# Patient Record
Sex: MALE | Employment: UNEMPLOYED | URBAN - NONMETROPOLITAN AREA
[De-identification: names, ages, dates, MRNs, and addresses within clinical notes are randomized per-mention and may not be internally consistent; named-entity substitution may affect disease eponyms.]

---

## 2021-03-15 ENCOUNTER — READMISSION MANAGEMENT (OUTPATIENT)
Dept: CALL CENTER | Facility: HOSPITAL | Age: 36
End: 2021-03-15

## 2021-03-15 ENCOUNTER — TELEPHONE (OUTPATIENT)
Dept: PEDIATRICS | Facility: OTHER | Age: 36
End: 2021-03-15

## 2021-03-15 NOTE — TELEPHONE ENCOUNTER
Caller: EDD WITH [facility]    Relationship to patient:     Best call back number: [phone]    PATIENT'S CALL BACK NUMBER [phone]    New or established patient?  [x] New  [] Established    Date of discharge: 03/15/2021    Facility discharged from: [facility]   Diagnosis/Symptoms: HEART ATTACK    Length of stay (If applicable): 03/14/2021 - 03/15/2021    Specialty Only: Did you see a [facility] provider?    [x] Yes  [] No  If so, who? DR MINDY GREY - CARDIOLOGIST

## 2021-03-15 NOTE — OUTREACH NOTE
Prep Survey      Responses   [facility] patient discharged from?  Non-[facility]   Is LACE score < 7 ?  Non-BH Discharge   Emergency Room discharge w/ pulse ox?  No   Eligibility  TCM   Hospital  [facility]   Date of Admission  03/14/21   Date of Discharge  03/15/21   Discharge diagnosis  Heart attack   Does the patient have one of the following disease processes/diagnoses(primary or secondary)?  Acute MI (STEMI,NSTEMI)   Prep survey completed?  Yes          Katlin Lim RN

## 2021-03-16 ENCOUNTER — TRANSITIONAL CARE MANAGEMENT TELEPHONE ENCOUNTER (OUTPATIENT)
Dept: CALL CENTER | Facility: HOSPITAL | Age: 36
End: 2021-03-16

## 2021-03-16 NOTE — OUTREACH NOTE
Call Center TCM Note      Responses   [facility] patient discharged from?  Non- [[facility]]   Does the patient have one of the following disease processes/diagnoses(primary or secondary)?  Acute MI (STEMI,NSTEMI)   TCM attempt successful?  No   Unsuccessful attempts  Attempt 1          Earlene Batres RN    3/16/2021, 14:09 EDT

## 2021-03-16 NOTE — OUTREACH NOTE
Call Center TCM Note      Responses   [facility] patient discharged from?  Non-BH [[facility]]   Does the patient have one of the following disease processes/diagnoses(primary or secondary)?  Acute MI (STEMI,NSTEMI)   TCM attempt successful?  Yes   Call start time  1417   Call end time  1429   Discharge diagnosis  Heart attack   Is patient permission given to speak with other caregiver?  No   Meds reviewed with patient/caregiver?  No [Patient to bring discharge medication list to appt on Thurs. Patient states that he has all meds prescribed and taking as directed. ]   Is the patient having any side effects they believe may be caused by any medication additions or changes?  No   Does the patient have all medications ordered at discharge?  Yes   Is the patient taking all medications as directed (includes completed medication regime)?  Yes   Does the patient have a primary care provider?   Yes [Patient has new PCP appt scheduled. ]   Does the patient have an appointment with their PCP within 7 days of discharge?  Yes   Comments regarding PCP  New PCP appt with Dr Alfonso Mcneill scheduled for Thurs 3/18/21   1115am   Has the patient kept scheduled appointments due by today?  N/A   Has home health visited the patient within 72 hours of discharge?  N/A   Psychosocial issues?  No   Did the patient receive a copy of their discharge instructions?  Yes   Nursing interventions  -- [Patient to bring discharge instructions to appt. Patient advised to seek closest ER with any s/s of MI: pain in chest/neck/jaw/back/shoulders/arms, shortness of breath, N/V, cold clammy sweat, dizziness/lightheadedness, heart racing/palpitations. ]   What is the patient's perception of their health status since discharge?  Improving   Is the patient/caregiver able to teach back signs and symptoms related to disease process for when to call PCP?  Yes   Is the patient/caregiver able to teach back signs and symptoms related to disease process for when  to call 911?  Yes   Is the patient/caregiver able to teach back the hierarchy of who to call/visit for symptoms/problems? PCP, Specialist, Home health nurse, Urgent Care, ED, 911  Yes   If the patient is a current smoker, are they able to teach back resources for cessation?  Smoking cessation medications [Patient reports using nicotine patches for smoking cessation. ]   TCM call completed?  Yes   Wrap up additional comments  Patient denies any further questions or needs until appt on Thurs.           Earlene Batres RN    3/16/2021, 14:29 EDT

## 2021-03-18 ENCOUNTER — OFFICE VISIT (OUTPATIENT)
Dept: FAMILY MEDICINE CLINIC | Facility: CLINIC | Age: 36
End: 2021-03-18

## 2021-03-18 VITALS
HEIGHT: 78 IN | WEIGHT: 264 LBS | SYSTOLIC BLOOD PRESSURE: 110 MMHG | HEART RATE: 89 BPM | OXYGEN SATURATION: 98 % | BODY MASS INDEX: 30.55 KG/M2 | DIASTOLIC BLOOD PRESSURE: 76 MMHG

## 2021-03-18 DIAGNOSIS — I10 ESSENTIAL HYPERTENSION: Primary | ICD-10-CM

## 2021-03-18 DIAGNOSIS — I21.4 NON-ST ELEVATION MYOCARDIAL INFARCTION (NSTEMI) (HCC): ICD-10-CM

## 2021-03-18 DIAGNOSIS — E78.5 HYPERLIPIDEMIA, UNSPECIFIED HYPERLIPIDEMIA TYPE: ICD-10-CM

## 2021-03-18 PROCEDURE — 99204 OFFICE O/P NEW MOD 45 MIN: CPT | Performed by: INTERNAL MEDICINE

## 2021-03-18 RX ORDER — ASPIRIN 81 MG/1
81 TABLET ORAL DAILY
COMMUNITY

## 2021-03-18 RX ORDER — ATORVASTATIN CALCIUM 40 MG/1
40 TABLET, FILM COATED ORAL DAILY
COMMUNITY

## 2021-03-18 RX ORDER — NITROGLYCERIN 80 MG/1
1 PATCH TRANSDERMAL DAILY
COMMUNITY

## 2021-03-18 RX ORDER — NICOTINE 21 MG/24HR
1 PATCH, TRANSDERMAL 24 HOURS TRANSDERMAL EVERY 24 HOURS
COMMUNITY
End: 2021-05-19

## 2021-03-18 NOTE — PROGRESS NOTES
Arun Anaya  1985  [id number]  Patient Care Team:  Alfonso Mcneill MD as PCP - General (Internal Medicine)    Arun Anaya is a 36 y.o. male here today to establish care.  This patient is accompanied by their self who contributes to the history of their care.    Chief Complaint:    Chief Complaint   Patient presents with   • Establish Care   • Heart Problem     heart attack on the 3/14/21-3/15/21 went to [facility] feels better today        History of Present Illness:   Admitted to [place] for chest pain. Dx with NSTEMI. Admitted. S/p PCI/, doesn't recall target vessle. No further chest pain, SOA, SHEA. Syncope    Denies myalgias. Has not returned to work as . Has cardiology follow up . Has family history of amyloidosis and sle and would like to be tested for these. His mother  at age 43 from complications/MI and amyloidosis.  He is eager to return to work .  Is cut his smoking down from 2 packs to half pack per day.    Past Medical History:   Diagnosis Date   • Hyperlipidemia    • Hypertension    • Myocardial infarction (CMS/HCC)        Past Surgical History:   Procedure Laterality Date   • CORONARY STENT PLACEMENT     • EAR RECONSTRUCTION          Family History   Problem Relation Age of Onset   • Cancer Mother    • Heart attack Mother    • Lupus Maternal Grandmother        Social History     Socioeconomic History   • Marital status: Single     Spouse name: Not on file   • Number of children: Not on file   • Years of education: Not on file   • Highest education level: Not on file   Tobacco Use   • Smoking status: Current Every Day Smoker     Packs/day: 0.50     Types: Cigarettes   • Smokeless tobacco: Never Used   Substance and Sexual Activity   • Alcohol use: Yes     Comment: occasionally 5th in a month of crown   • Drug use: Never   • Sexual activity: Defer       No Known Allergies    Review of Systems:    Review of Systems   Constitutional: Negative for chills,  "fatigue, fever, unexpected weight gain and unexpected weight loss.   HENT: Negative for ear pain, postnasal drip, sinus pressure and sore throat.    Eyes: Negative for blurred vision, double vision and visual disturbance.   Respiratory: Negative for cough, shortness of breath and wheezing.    Cardiovascular: Negative for chest pain, palpitations and leg swelling.   Gastrointestinal: Negative for abdominal pain, blood in stool, diarrhea, nausea and vomiting.   Endocrine: Negative for cold intolerance, heat intolerance, polydipsia, polyphagia and polyuria.   Genitourinary: Negative for dysuria, flank pain and hematuria.   Musculoskeletal: Negative for arthralgias and joint swelling.   Skin: Negative for dry skin and rash.   Neurological: Negative for weakness, numbness and headache.   Psychiatric/Behavioral: Negative for self-injury, suicidal ideas and depressed mood.       Vitals:    03/18/21 1101   BP: 110/76   Pulse: 89   SpO2: 98%   Weight: 120 kg (264 lb)   Height: 210.8 cm (83\")     Body mass index is 26.94 kg/m².      Current Outpatient Medications:   •  aspirin 81 MG EC tablet, Take 81 mg by mouth Daily., Disp: , Rfl:   •  atorvastatin (LIPITOR) 40 MG tablet, Take 40 mg by mouth Daily., Disp: , Rfl:   •  metoprolol tartrate (LOPRESSOR) 25 MG tablet, Take 25 mg by mouth 2 (Two) Times a Day., Disp: , Rfl:   •  nicotine (NICODERM CQ) 21 MG/24HR patch, Place 1 patch on the skin as directed by provider Daily., Disp: , Rfl:   •  nitroglycerin (Nitro-Dur) 0.4 MG/HR patch, Place 1 patch on the skin as directed by provider Daily., Disp: , Rfl:   •  TICAGRELOR PO, Take 90 mg by mouth 2 (two) times a day., Disp: , Rfl:     Physical Exam:    Physical Exam  Vitals and nursing note reviewed.   Constitutional:       General: He is not in acute distress.     Appearance: He is well-developed. He is not diaphoretic.   HENT:      Head: Normocephalic and atraumatic.      Right Ear: External ear normal.      Left Ear: External ear " normal.      Mouth/Throat:      Pharynx: No oropharyngeal exudate.   Eyes:      General: No scleral icterus.        Right eye: No discharge.      Conjunctiva/sclera: Conjunctivae normal.   Neck:      Thyroid: No thyromegaly.      Vascular: No JVD.      Trachea: No tracheal deviation.   Cardiovascular:      Rate and Rhythm: Normal rate and regular rhythm.      Pulses: Normal pulses.      Heart sounds: Normal heart sounds.      Comments: PMI nondisplaced  Pulmonary:      Effort: Pulmonary effort is normal.      Breath sounds: Normal breath sounds. No wheezing or rales.   Abdominal:      General: Bowel sounds are normal.      Palpations: Abdomen is soft.      Tenderness: There is no abdominal tenderness. There is no guarding or rebound.   Musculoskeletal:      Cervical back: Normal range of motion and neck supple.      Comments: Normal gait   Lymphadenopathy:      Cervical: No cervical adenopathy.   Skin:     General: Skin is warm and dry.      Capillary Refill: Capillary refill takes less than 2 seconds.      Coloration: Skin is not pale.      Findings: No rash.   Neurological:      Mental Status: He is alert and oriented to person, place, and time.      Motor: No abnormal muscle tone.      Coordination: Coordination normal.   Psychiatric:         Judgment: Judgment normal.         Procedures    Results Review:    None    Assessment/Plan:  Is a 36-year-old gentleman with hypertension dyslipidemia went left heart catheterization and percutaneous intervention in [facility].  We do not have records that we requested him earlier this morning.  He is asymptomatic.  He maintains on dual antiplatelet therapy.  His blood pressure is under control.  He will return here in approximately 8 weeks for fasting lipid profile.  I have released him to return to work without restrictions.  Problem List Items Addressed This Visit        Cardiac and Vasculature    Hypertension - Primary    Current Assessment & Plan     Hypertension  is improving with treatment.  Continue current treatment regimen.  Dietary sodium restriction.  Weight loss.  Regular aerobic exercise.  Stop smoking.  Continue current medications.  Blood pressure will be reassessed at the next regular appointment.         Relevant Medications    metoprolol tartrate (LOPRESSOR) 25 MG tablet    Myocardial infarction (CMS/HCC)    Relevant Medications    nitroglycerin (Nitro-Dur) 0.4 MG/HR patch    metoprolol tartrate (LOPRESSOR) 25 MG tablet    TICAGRELOR PO    Hyperlipidemia    Current Assessment & Plan     Lipid abnormalities are newly identified.  Nutritional counseling was provided. and Pharmacotherapy as ordered.  Lipids will be reassessed  8 weeks.         Relevant Medications    atorvastatin (LIPITOR) 40 MG tablet          Plan of care reviewed with patient at the conclusion of today's visit. Education was provided regarding diagnosis and management.  Patient verbalizes understanding of and agreement with management plan.    Return in about 2 months (around 5/18/2021), or cholesterol.    Alfonso Mcneill MD    Please note that portions of this note may have been completed with a voice recognition program. Efforts were made to edit the dictations, but occasionally words are mistranscribed.

## 2021-03-18 NOTE — ASSESSMENT & PLAN NOTE
Lipid abnormalities are newly identified.  Nutritional counseling was provided. and Pharmacotherapy as ordered.  Lipids will be reassessed  8 weeks.

## 2021-04-13 ENCOUNTER — OFFICE VISIT CONVERTED (OUTPATIENT)
Dept: CARDIOLOGY | Facility: CLINIC | Age: 36
End: 2021-04-13
Attending: INTERNAL MEDICINE

## 2021-05-14 VITALS
HEART RATE: 84 BPM | BODY MASS INDEX: 36.16 KG/M2 | SYSTOLIC BLOOD PRESSURE: 126 MMHG | DIASTOLIC BLOOD PRESSURE: 80 MMHG | HEIGHT: 72 IN | WEIGHT: 267 LBS

## 2021-05-14 NOTE — PROGRESS NOTES
Progress Note      Patient Name: Arun Anaya   Patient ID: [id number]   Sex: Male   YOB: 1985        Visit Date: April 13, 2021    Provider: Amos Dave MD   Location: [facility]   Location Address: [address] [place] [address], [facility]   WENDY Cuevas  [id number]   Location Phone: [phone]          Chief Complaint     Hospital follow-up.       History Of Present Illness  REFERRING CARE PROVIDER: REFERRING CARE PROVIDER NAME   Arun Anaya is a 36 year old male who presents for routine hospital follow-up today and states he is feeling well since his discharge. He was treated in mid-March for a non-ST elevation myocardial infarction. Urgent left heart catheterization was performed at that time, which revealed a culprit 95% stenosis in the proximal segment of the first obtuse marginal branch of the circumflex. Successful percutaneous coronary intervention was performed using a Xience Samanta 2.5/15 mm drug eluting stent. The remainder of his coronary arteries had only minimal plaquing, and his left ventricular systolic function was preserved with an estimated ejection fraction of 60%. He does not report any bleeding problems on dual antiplatelet therapy with aspirin and Brilinta. He has not experienced any recurrent chest pain/pressure or any dyspnea, palpitations, orthopnea, PND, peripheral edema, lightheadedness, or syncope. He has resumed his normal baseline physical activities. His blood pressure was well controlled at 126/80 today.   PAST MEDICAL HISTORY: Chest pain; NSTEMI; CORONARY ARTERY DISEASE s/p cardiac stenting; Elevated troponin; Elevated blood pressure without formal diagnosis of hypertension; Current tobacco smoker; Family history of premature CAD.   PSYCHOSOCIAL HISTORY: Rarely uses alcohol. Currently smokes 1-1/2 pack per day.   CURRENT MEDICATIONS: Medication list was reviewed and is as documented.      ALLERGIES: No known drug allergies.       Review of  Systems  · Cardiovascular  o Admits  o : swelling (feet, ankles, hands), shortness of breath while walking or lying flat, chest pain or angina pectoris   o Denies  o : palpitations (fast, fluttering, or skipping beats)  · Respiratory  o Denies  o : chronic or frequent cough      Vitals  Date Time BP Position Site L\R Cuff Size HR RR TEMP (F) WT  HT  BMI kg/m2 BSA m2 O2 Sat FR L/min FiO2        04/13/2021 03:42 /80 Sitting    84 - R   267lbs 0oz 6'   36.21 2.48             Physical Examination  · Respiratory  o Auscultation of Lungs  o : Clear to auscultation bilaterally. No wheezing, rales, or rhonchi, no tachypnea. Normal effort with no increased work of breathing.   · Cardiovascular  o Heart  o : Regular rate and rhythm. Normal S1 and S2, no S3 or S4 gallop. No murmur. PMI is not displaced.  · Gastrointestinal  o Abdominal Examination  o : Soft, obese, nondistended, nontender. Normal bowel sounds in all quadrants. No masses.   · Extremities  o Extremities  o : No cyanosis, clubbing, or edema. 2+ radial pulses bilaterally. No erythema or bruising at his right radial cath access site.     CONSTITUTIONAL: Well-developed, well-nourished, alert and oriented, no acute distress.   NECK: Supple, no JVD, no carotid bruit, no masses.   NEUROLOGIC: Normal speech. Cranial nerves II-XII grossly intact. No focal motor deficits.   SKIN: Warm and dry. No jaundice, no rashes or lesions.              Assessment     1.  Coronary artery disease with non-ST elevation myocardial infarction in March 2021 treated with percutaneous coronary intervention to the culprit OM1 branch of the circumflex using a Xience Samanta 2.5/15 mm drug-eluting stent.  2.  Hyperlipidemia, on high-intensity statin therapy.  3.  Chronic ongoing tobacco abuse.  4.  Obesity with a stable BMI of 36.2 today.          Plan     He does not report any recurrent symptoms and is doing well since his hospital discharge. We will continue dual antiplatelet therapy  with aspirin and Brilinta for a minimum of 1 year. We will plan to continue long term beta-blocker therapy with metoprolol, as well as high-intensity statin therapy with an LDL goal of less than 70. His lipid profile and routine lab work is followed by his primary care physician. Tobacco cessation was again strongly recommended and extensive cessation counseling was provided today. Unfortunately, Mr. Anaya is not ready to quit smoking at the present time, and he declines any pharmacologic or other measures to assist with cessation currently. I will readdress this issue at his next visit. Significant weight loss was also recommended, and we reviewed some potential weekly exercise regimens to help with this goal. If he continues to feel well, I will see him back in 8 months for reassessment.         Amos Dave MD  BP/vh                Electronically Signed by: Amos Dave MD -Author on May 5, 2021 10:19:45 AM

## 2021-05-19 ENCOUNTER — OFFICE VISIT (OUTPATIENT)
Dept: FAMILY MEDICINE CLINIC | Facility: CLINIC | Age: 36
End: 2021-05-19

## 2021-05-19 VITALS
HEIGHT: 72 IN | DIASTOLIC BLOOD PRESSURE: 58 MMHG | BODY MASS INDEX: 36.3 KG/M2 | OXYGEN SATURATION: 98 % | SYSTOLIC BLOOD PRESSURE: 132 MMHG | WEIGHT: 268 LBS | HEART RATE: 91 BPM

## 2021-05-19 DIAGNOSIS — E78.5 HYPERLIPIDEMIA, UNSPECIFIED HYPERLIPIDEMIA TYPE: ICD-10-CM

## 2021-05-19 DIAGNOSIS — I10 ESSENTIAL HYPERTENSION: ICD-10-CM

## 2021-05-19 DIAGNOSIS — M54.12 CERVICAL RADICULOPATHY: Primary | ICD-10-CM

## 2021-05-19 PROCEDURE — 99214 OFFICE O/P EST MOD 30 MIN: CPT | Performed by: INTERNAL MEDICINE

## 2021-05-19 RX ORDER — LIDOCAINE 50 MG/G
1 PATCH TOPICAL EVERY 24 HOURS
Qty: 30 EACH | Refills: 1 | Status: SHIPPED | OUTPATIENT
Start: 2021-05-19

## 2021-05-19 RX ORDER — BACLOFEN 10 MG/1
10 TABLET ORAL 2 TIMES DAILY
Qty: 60 TABLET | Refills: 3 | Status: SHIPPED | OUTPATIENT
Start: 2021-05-19

## 2021-05-19 NOTE — PATIENT INSTRUCTIONS
Cervical Radiculopathy    Cervical radiculopathy means that a nerve in the neck (a cervical nerve) is pinched or bruised. This can happen because of an injury to the cervical spine (vertebrae) in the neck, or as a normal part of getting older. This can cause pain or loss of feeling (numbness) that runs from your neck all the way down to your arm and fingers. Often, this condition gets better with rest. Treatment may be needed if the condition does not get better.  What are the causes?  · A neck injury.  · A bulging disk in your spine.  · Muscle movements that you cannot control (muscle spasms).  · Tight muscles in your neck due to overuse.  · Arthritis.  · Breakdown in the bones and joints of the spine (spondylosis) due to getting older.  · Bone spurs that form near the nerves in the neck.  What are the signs or symptoms?  · Pain. The pain may:  ? Run from the neck to the arm and hand.  ? Be very bad or irritating.  ? Be worse when you move your neck.  · Loss of feeling or tingling in your arm or hand.  · Weakness in your arm or hand, in very bad cases.  How is this treated?  In many cases, treatment is not needed for this condition. With rest, the condition often gets better over time. If treatment is needed, options may include:  · Wearing a soft neck collar (cervical collar) for short periods of time, as told by your doctor.  · Doing exercises (physical therapy) to strengthen your neck muscles.  · Taking medicines.  · Having shots (injections) in your spine, in very bad cases.  · Having surgery. This may be needed if other treatments do not help. The type of surgery that is used depends on the cause of your condition.  Follow these instructions at home:  If you have a soft neck collar:  · Wear it as told by your doctor. Remove it only as told by your doctor.  · Ask your doctor if you can remove the collar for cleaning and bathing. If you are allowed to remove the collar for cleaning or bathing:  ? Follow  instructions from your doctor about how to remove the collar safely.  ? Clean the collar by wiping it with mild soap and water and drying it completely.  ? Take out any removable pads in the collar every 1-2 days. Wash them by hand with soap and water. Let them air-dry completely before you put them back in the collar.  ? Check your skin under the collar for redness or sores. If you see any, tell your doctor.  Managing pain         · Take over-the-counter and prescription medicines only as told by your doctor.  · If told, put ice on the painful area.  ? If you have a soft neck collar, remove it as told by your doctor.  ? Put ice in a plastic bag.  ? Place a towel between your skin and the bag.  ? Leave the ice on for 20 minutes, 2-3 times a day.  · If using ice does not help, you can try using heat. Use the heat source that your doctor recommends, such as a moist heat pack or a heating pad.  ? Place a towel between your skin and the heat source.  ? Leave the heat on for 20-30 minutes.  ? Remove the heat if your skin turns bright red. This is very important if you are unable to feel pain, heat, or cold. You may have a greater risk of getting burned.  · You may try a gentle neck and shoulder rub (massage).  Activity  · Rest as needed.  · Return to your normal activities as told by your doctor. Ask your doctor what activities are safe for you.  · Do exercises as told by your doctor or physical therapist.  · Do not lift anything that is heavier than 10 lb (4.5 kg) until your doctor tells you that it is safe.  General instructions  · Use a flat pillow when you sleep.  · Do not drive while wearing a soft neck collar. If you do not have a soft neck collar, ask your doctor if it is safe to drive while your neck heals.  · Ask your doctor if the medicine prescribed to you requires you to avoid driving or using heavy machinery.  · Do not use any products that contain nicotine or tobacco, such as cigarettes, e-cigarettes, and  chewing tobacco. These can delay healing. If you need help quitting, ask your doctor.  · Keep all follow-up visits as told by your doctor. This is important.  Contact a doctor if:  · Your condition does not get better with treatment.  Get help right away if:  · Your pain gets worse and is not helped with medicine.  · You lose feeling or feel weak in your hand, arm, face, or leg.  · You have a high fever.  · You have a stiff neck.  · You cannot control when you poop or pee (have incontinence).  · You have trouble with walking, balance, or talking.  Summary  · Cervical radiculopathy means that a nerve in the neck is pinched or bruised.  · A nerve can get pinched from a bulging disk, arthritis, an injury to the neck, or other causes.  · Symptoms include pain, tingling, or loss of feeling that goes from the neck into the arm or hand.  · Weakness in your arm or hand can happen in very bad cases.  · Treatment may include resting, wearing a soft neck collar, and doing exercises. You might need to take medicines for pain. In very bad cases, shots or surgery may be needed.  This information is not intended to replace advice given to you by your health care provider. Make sure you discuss any questions you have with your health care provider.  Document Revised: 11/08/2019 Document Reviewed: 11/08/2019  [employer] Patient Education © 2021 [employer]    Neck Exercises  Ask your health care provider which exercises are safe for you. Do exercises exactly as told by your health care provider and adjust them as directed. It is normal to feel mild stretching, pulling, tightness, or discomfort as you do these exercises. Stop right away if you feel sudden pain or your pain gets worse. Do not begin these exercises until told by your health care provider.  Neck exercises can be important for many reasons. They can improve strength and maintain flexibility in your neck, which will help your upper back and prevent neck pain.  Stretching  exercises  Rotation neck stretching    1. Sit in a chair or stand up.  2. Place your feet flat on the floor, shoulder width apart.  3. Slowly turn your head (rotate) to the right until a slight stretch is felt. Turn it all the way to the right so you can look over your right shoulder. Do not tilt or tip your head.  4. Hold this position for 10-30 seconds.  5. Slowly turn your head (rotate) to the left until a slight stretch is felt. Turn it all the way to the left so you can look over your left shoulder. Do not tilt or tip your head.  6. Hold this position for 10-30 seconds.  Repeat __________ times. Complete this exercise __________ times a day.  Neck retraction  1. Sit in a sturdy chair or stand up.  2. Look straight ahead. Do not bend your neck.  3. Use your fingers to push your chin backward (retraction). Do not bend your neck for this movement. Continue to face straight ahead. If you are doing the exercise properly, you will feel a slight sensation in your throat and a stretch at the back of your neck.  4. Hold the stretch for 1-2 seconds.  Repeat __________ times. Complete this exercise __________ times a day.  Strengthening exercises  Neck press  1. Lie on your back on a firm bed or on the floor with a pillow under your head.  2. Use your neck muscles to push your head down on the pillow and straighten your spine.  3. Hold the position as well as you can. Keep your head facing up (in a neutral position) and your chin tucked.  4. Slowly count to 5 while holding this position.  Repeat __________ times. Complete this exercise __________ times a day.  Isometrics  These are exercises in which you strengthen the muscles in your neck while keeping your neck still (isometrics).  1. Sit in a supportive chair and place your hand on your forehead.  2. Keep your head and face facing straight ahead. Do not flex or extend your neck while doing isometrics.  3. Push forward with your head and neck while pushing back with  your hand. Hold for 10 seconds.  4. Do the sequence again, this time putting your hand against the back of your head. Use your head and neck to push backward against the hand pressure.  5. Finally, do the same exercise on either side of your head, pushing sideways against the pressure of your hand.  Repeat __________ times. Complete this exercise __________ times a day.  Prone head lifts  1. Lie face-down (prone position), resting on your elbows so that your chest and upper back are raised.  2. Start with your head facing downward, near your chest. Position your chin either on or near your chest.  3. Slowly lift your head upward. Lift until you are looking straight ahead. Then continue lifting your head as far back as you can comfortably stretch.  4. Hold your head up for 5 seconds. Then slowly lower it to your starting position.  Repeat __________ times. Complete this exercise __________ times a day.  Supine head lifts  1. Lie on your back (supine position), bending your knees to point to the ceiling and keeping your feet flat on the floor.  2. Lift your head slowly off the floor, raising your chin toward your chest.  3. Hold for 5 seconds.  Repeat __________ times. Complete this exercise __________ times a day.  Scapular retraction  1. Stand with your arms at your sides. Look straight ahead.  2. Slowly pull both shoulders (scapulae) backward and downward (retraction) until you feel a stretch between your shoulder blades in your upper back.  3. Hold for 10-30 seconds.  4. Relax and repeat.  Repeat __________ times. Complete this exercise __________ times a day.  Contact a health care provider if:  · Your neck pain or discomfort gets much worse when you do an exercise.  · Your neck pain or discomfort does not improve within 2 hours after you exercise.  If you have any of these problems, stop exercising right away. Do not do the exercises again unless your health care provider says that you can.  Get help right away  if:  · You develop sudden, severe neck pain.  If this happens, stop exercising right away. Do not do the exercises again unless your health care provider says that you can.  This information is not intended to replace advice given to you by your health care provider. Make sure you discuss any questions you have with your health care provider.  Document Revised: 10/16/2019 Document Reviewed: 10/16/2019  [employer] Patient Education © 2021 [employer]

## 2021-05-19 NOTE — ASSESSMENT & PLAN NOTE
Hypertension is improving with treatment.  Continue current treatment regimen.  Dietary sodium restriction.  Regular aerobic exercise.  Stop smoking.  Continue current medications.  Blood pressure will be reassessed at the next regular appointment.

## 2021-05-19 NOTE — PROGRESS NOTES
Arun Anaya  1985  [id number]  Patient Care Team:  Alfonso Mcneill MD as PCP - General (Internal Medicine)    Arun Anaya is a 36 y.o. male here today for follow up.     This patient is accompanied by their self who contributes to the history of their care.    Chief Complaint:    Chief Complaint   Patient presents with   • Hyperlipidemia        History of Present Illness:  I have reviewed and/or updated the patient's past medical, past surgical, family, social history, problem list and allergies as appropriate.      Stressed and resumed smoking, nearly 2 full packs per day. Denies f./c cough or wheezing. Denies orthopnea or pnd.  Denies dark urin or muscle aches.   Reports rue pain 4-10. Lasts nearly all day. Feels it is worse since stent was place. Constant, upper shoulder radiating in to right biceps. occaisional finger tips num. Denies weakness. Right handed. This is assoicated with right sided neck pain.     Denies chest pain shortness of breath orthopnea or PND.  Continues to take Lipitor denies any dark urine or muscle pain.    Review of Systems:    Review of Systems   Constitutional: Negative for chills, fatigue, fever, unexpected weight gain and unexpected weight loss.   HENT: Negative for ear pain, postnasal drip, sinus pressure and sore throat.    Eyes: Negative for blurred vision, double vision and visual disturbance.   Respiratory: Negative for cough, shortness of breath and wheezing.    Cardiovascular: Negative for chest pain, palpitations and leg swelling.   Gastrointestinal: Negative for abdominal pain, blood in stool, diarrhea, nausea and vomiting.   Endocrine: Negative for cold intolerance, heat intolerance, polydipsia, polyphagia and polyuria.   Genitourinary: Negative for dysuria, flank pain and hematuria.   Musculoskeletal: Positive for arthralgias and neck pain. Negative for joint swelling.   Skin: Negative for dry skin and rash.   Neurological: Positive for weakness and numbness.  "Negative for headache.   Psychiatric/Behavioral: Negative for self-injury, suicidal ideas and depressed mood.       Vitals:    05/19/21 1431   BP: 132/58   Pulse: 91   SpO2: 98%   Weight: 122 kg (268 lb)   Height: 182.9 cm (72\")     Body mass index is 36.35 kg/m².      Current Outpatient Medications:   •  aspirin 81 MG EC tablet, Take 81 mg by mouth Daily., Disp: , Rfl:   •  atorvastatin (LIPITOR) 40 MG tablet, Take 40 mg by mouth Daily., Disp: , Rfl:   •  metoprolol tartrate (LOPRESSOR) 25 MG tablet, Take 25 mg by mouth 2 (Two) Times a Day., Disp: , Rfl:   •  nitroglycerin (Nitro-Dur) 0.4 MG/HR patch, Place 1 patch on the skin as directed by provider Daily., Disp: , Rfl:   •  TICAGRELOR PO, Take 90 mg by mouth 2 (two) times a day., Disp: , Rfl:   •  baclofen (LIORESAL) 10 MG tablet, Take 1 tablet by mouth 2 (Two) Times a Day., Disp: 60 tablet, Rfl: 3  •  lidocaine (Lidoderm) 5 %, Place 1 patch on the skin as directed by provider Daily. Remove & Discard patch within 12 hours or as directed by MD, Disp: 30 each, Rfl: 1    Physical Exam:    Physical Exam  Vitals and nursing note reviewed.   Constitutional:       General: He is not in acute distress.     Appearance: He is well-developed. He is not diaphoretic.   HENT:      Head: Normocephalic and atraumatic.      Right Ear: External ear normal.      Left Ear: External ear normal.      Mouth/Throat:      Pharynx: No oropharyngeal exudate.   Eyes:      General: No scleral icterus.        Right eye: No discharge.      Conjunctiva/sclera: Conjunctivae normal.   Neck:      Thyroid: No thyromegaly.      Vascular: No JVD.      Trachea: No tracheal deviation.   Cardiovascular:      Rate and Rhythm: Normal rate and regular rhythm.      Heart sounds: Normal heart sounds.      Comments: PMI nondisplaced  Pulmonary:      Effort: Pulmonary effort is normal.      Breath sounds: Normal breath sounds. No wheezing or rales.   Abdominal:      General: Bowel sounds are normal.      " Palpations: Abdomen is soft.      Tenderness: There is no abdominal tenderness. There is no guarding or rebound.   Musculoskeletal:      Cervical back: Normal range of motion and neck supple.      Comments: Normal gait  Diffuse cervical paraspinous musculature on the right.  Range of motion is full.  He is quite spastic,-trapezius shoulder area.  Shoulder exam full range of motion with abduction adduction to horizontal extension and flexion as well as vertical extension and flexion.  He does have slightly weak strength graded 4 out of 5 with right upper extremity forearm extension.  Tinel's sign negative.   Lymphadenopathy:      Cervical: No cervical adenopathy.   Skin:     General: Skin is warm and dry.      Capillary Refill: Capillary refill takes less than 2 seconds.      Coloration: Skin is not pale.      Findings: No rash.   Neurological:      Mental Status: He is alert and oriented to person, place, and time.      Motor: No abnormal muscle tone.      Coordination: Coordination normal.   Psychiatric:         Judgment: Judgment normal.         Procedures    Results Review:    I reviewed the patient's new clinical results.    Assessment/Plan:     Problem List Items Addressed This Visit        Cardiac and Vasculature    Hypertension    Current Assessment & Plan     Hypertension is improving with treatment.  Continue current treatment regimen.  Dietary sodium restriction.  Regular aerobic exercise.  Stop smoking.  Continue current medications.  Blood pressure will be reassessed at the next regular appointment.         Relevant Medications    metoprolol tartrate (LOPRESSOR) 25 MG tablet    Other Relevant Orders    Lipid Panel    Comprehensive Metabolic Panel    Hyperlipidemia    Current Assessment & Plan     Lipid abnormalities are unchanged.  Pharmacotherapy as ordered.  Lipids will be reassessed in 6 months.  Follow-up fasting lipid profile today.         Relevant Medications    atorvastatin (LIPITOR) 40 MG tablet  "   Other Relevant Orders    Lipid Panel    Comprehensive Metabolic Panel       Neuro    Cervical radiculopathy - Primary    Current Assessment & Plan     Given him to get his physical therapy.  Baclofen 10 mg p.o. 3 times daily pain.  Ice 20 minutes on 20 neck exercises at home \".  Plain cervical x-ray.  I suspect he will need an MRI.         Relevant Orders    XR Spine Cervical 2 or 3 View    Ambulatory Referral to Physical Therapy Evaluate and treat          Plan of care reviewed with patient at the conclusion of today's visit. Education was provided regarding diagnosis and management.  Patient verbalizes understanding of and agreement with management plan.    Return in about 2 weeks (around 6/2/2021), or neck pain.    Alfonso Mcneill MD    Please note that portions of this note may have been completed with a voice recognition program. Efforts were made to edit the dictations, but occasionally words are mistranscribed.         "

## 2021-05-19 NOTE — ASSESSMENT & PLAN NOTE
Lipid abnormalities are unchanged.  Pharmacotherapy as ordered.  Lipids will be reassessed in 6 months.  Follow-up fasting lipid profile today.

## 2021-05-19 NOTE — ASSESSMENT & PLAN NOTE
"Given him to get his physical therapy.  Baclofen 10 mg p.o. 3 times daily pain.  Ice 20 minutes on 20 neck exercises at home \".  Plain cervical x-ray.  I suspect he will need an MRI.  "

## 2023-04-12 ENCOUNTER — HOSPITAL ENCOUNTER (EMERGENCY)
Facility: HOSPITAL | Age: 38
Discharge: PSYCHIATRIC OR SUBSTANCE USE FACILITY | DRG: 885 | End: 2023-04-12
Attending: EMERGENCY MEDICINE | Admitting: EMERGENCY MEDICINE
Payer: COMMERCIAL

## 2023-04-12 ENCOUNTER — HOSPITAL ENCOUNTER (INPATIENT)
Facility: HOSPITAL | Age: 38
LOS: 2 days | Discharge: PSYCHIATRIC OR SUBSTANCE USE FACILITY | DRG: 885 | End: 2023-04-14
Attending: PSYCHIATRY & NEUROLOGY | Admitting: PSYCHIATRY & NEUROLOGY
Payer: COMMERCIAL

## 2023-04-12 VITALS
HEIGHT: 73 IN | HEART RATE: 85 BPM | RESPIRATION RATE: 18 BRPM | DIASTOLIC BLOOD PRESSURE: 82 MMHG | BODY MASS INDEX: 25.58 KG/M2 | OXYGEN SATURATION: 100 % | SYSTOLIC BLOOD PRESSURE: 137 MMHG | TEMPERATURE: 98 F | WEIGHT: 193 LBS

## 2023-04-12 DIAGNOSIS — R45.851 SUICIDAL IDEATIONS: Primary | ICD-10-CM

## 2023-04-12 DIAGNOSIS — T14.91XA SUICIDAL BEHAVIOR WITH ATTEMPTED SELF-INJURY: ICD-10-CM

## 2023-04-12 PROBLEM — F33.2 SEVERE RECURRENT MAJOR DEPRESSION WITHOUT PSYCHOTIC FEATURES: Status: ACTIVE | Noted: 2023-04-12

## 2023-04-12 LAB
ALBUMIN SERPL-MCNC: 4.4 G/DL (ref 3.5–5.2)
ALBUMIN/GLOB SERPL: 1.6 G/DL
ALP SERPL-CCNC: 90 U/L (ref 39–117)
ALT SERPL W P-5'-P-CCNC: 13 U/L (ref 1–41)
AMPHET+METHAMPHET UR QL: POSITIVE
ANION GAP SERPL CALCULATED.3IONS-SCNC: 9 MMOL/L (ref 5–15)
APAP SERPL-MCNC: <5 MCG/ML (ref 0–30)
AST SERPL-CCNC: 16 U/L (ref 1–40)
BARBITURATES UR QL SCN: NEGATIVE
BASOPHILS # BLD AUTO: 0.07 10*3/MM3 (ref 0–0.2)
BASOPHILS NFR BLD AUTO: 0.8 % (ref 0–1.5)
BENZODIAZ UR QL SCN: NEGATIVE
BILIRUB SERPL-MCNC: 0.6 MG/DL (ref 0–1.2)
BUN SERPL-MCNC: 13 MG/DL (ref 6–20)
BUN/CREAT SERPL: 16.9 (ref 7–25)
CALCIUM SPEC-SCNC: 10.4 MG/DL (ref 8.6–10.5)
CANNABINOIDS SERPL QL: POSITIVE
CHLORIDE SERPL-SCNC: 102 MMOL/L (ref 98–107)
CO2 SERPL-SCNC: 30 MMOL/L (ref 22–29)
COCAINE UR QL: NEGATIVE
CREAT SERPL-MCNC: 0.77 MG/DL (ref 0.76–1.27)
DEPRECATED RDW RBC AUTO: 43.5 FL (ref 37–54)
EGFRCR SERPLBLD CKD-EPI 2021: 118.3 ML/MIN/1.73
EOSINOPHIL # BLD AUTO: 0.21 10*3/MM3 (ref 0–0.4)
EOSINOPHIL NFR BLD AUTO: 2.4 % (ref 0.3–6.2)
ERYTHROCYTE [DISTWIDTH] IN BLOOD BY AUTOMATED COUNT: 13.4 % (ref 12.3–15.4)
ETHANOL BLD-MCNC: <10 MG/DL (ref 0–10)
ETHANOL UR QL: <0.01 %
GLOBULIN UR ELPH-MCNC: 2.8 GM/DL
GLUCOSE SERPL-MCNC: 99 MG/DL (ref 65–99)
HCT VFR BLD AUTO: 48.4 % (ref 37.5–51)
HGB BLD-MCNC: 16.3 G/DL (ref 13–17.7)
HOLD SPECIMEN: NORMAL
HOLD SPECIMEN: NORMAL
IMM GRANULOCYTES # BLD AUTO: 0.02 10*3/MM3 (ref 0–0.05)
IMM GRANULOCYTES NFR BLD AUTO: 0.2 % (ref 0–0.5)
LYMPHOCYTES # BLD AUTO: 3.29 10*3/MM3 (ref 0.7–3.1)
LYMPHOCYTES NFR BLD AUTO: 37.1 % (ref 19.6–45.3)
MCH RBC QN AUTO: 29.6 PG (ref 26.6–33)
MCHC RBC AUTO-ENTMCNC: 33.7 G/DL (ref 31.5–35.7)
MCV RBC AUTO: 87.8 FL (ref 79–97)
METHADONE UR QL SCN: NEGATIVE
MONOCYTES # BLD AUTO: 0.77 10*3/MM3 (ref 0.1–0.9)
MONOCYTES NFR BLD AUTO: 8.7 % (ref 5–12)
NEUTROPHILS NFR BLD AUTO: 4.51 10*3/MM3 (ref 1.7–7)
NEUTROPHILS NFR BLD AUTO: 50.8 % (ref 42.7–76)
NRBC BLD AUTO-RTO: 0 /100 WBC (ref 0–0.2)
OPIATES UR QL: NEGATIVE
OXYCODONE UR QL SCN: NEGATIVE
PLATELET # BLD AUTO: 243 10*3/MM3 (ref 140–450)
PMV BLD AUTO: 9 FL (ref 6–12)
POTASSIUM SERPL-SCNC: 5 MMOL/L (ref 3.5–5.2)
PROT SERPL-MCNC: 7.2 G/DL (ref 6–8.5)
RBC # BLD AUTO: 5.51 10*6/MM3 (ref 4.14–5.8)
SALICYLATES SERPL-MCNC: <0.3 MG/DL
SODIUM SERPL-SCNC: 141 MMOL/L (ref 136–145)
WBC NRBC COR # BLD: 8.87 10*3/MM3 (ref 3.4–10.8)
WHOLE BLOOD HOLD COAG: NORMAL
WHOLE BLOOD HOLD SPECIMEN: NORMAL

## 2023-04-12 PROCEDURE — 85025 COMPLETE CBC W/AUTO DIFF WBC: CPT | Performed by: PHYSICIAN ASSISTANT

## 2023-04-12 PROCEDURE — 80179 DRUG ASSAY SALICYLATE: CPT | Performed by: PHYSICIAN ASSISTANT

## 2023-04-12 PROCEDURE — 80307 DRUG TEST PRSMV CHEM ANLYZR: CPT | Performed by: EMERGENCY MEDICINE

## 2023-04-12 PROCEDURE — 82077 ASSAY SPEC XCP UR&BREATH IA: CPT | Performed by: EMERGENCY MEDICINE

## 2023-04-12 PROCEDURE — 99284 EMERGENCY DEPT VISIT MOD MDM: CPT

## 2023-04-12 PROCEDURE — 36415 COLL VENOUS BLD VENIPUNCTURE: CPT | Performed by: PHYSICIAN ASSISTANT

## 2023-04-12 PROCEDURE — 99283 EMERGENCY DEPT VISIT LOW MDM: CPT

## 2023-04-12 PROCEDURE — 80143 DRUG ASSAY ACETAMINOPHEN: CPT | Performed by: PHYSICIAN ASSISTANT

## 2023-04-12 PROCEDURE — 80053 COMPREHEN METABOLIC PANEL: CPT | Performed by: PHYSICIAN ASSISTANT

## 2023-04-12 RX ORDER — TRAZODONE HYDROCHLORIDE 50 MG/1
100 TABLET ORAL NIGHTLY PRN
Status: DISCONTINUED | OUTPATIENT
Start: 2023-04-12 | End: 2023-04-14 | Stop reason: HOSPADM

## 2023-04-12 RX ORDER — DIPHENHYDRAMINE HCL 50 MG
50 CAPSULE ORAL EVERY 4 HOURS PRN
Status: DISCONTINUED | OUTPATIENT
Start: 2023-04-12 | End: 2023-04-14 | Stop reason: HOSPADM

## 2023-04-12 RX ORDER — HALOPERIDOL 5 MG/ML
5 INJECTION INTRAMUSCULAR EVERY 4 HOURS PRN
Status: DISCONTINUED | OUTPATIENT
Start: 2023-04-12 | End: 2023-04-14 | Stop reason: HOSPADM

## 2023-04-12 RX ORDER — ACETAMINOPHEN 325 MG/1
650 TABLET ORAL EVERY 4 HOURS PRN
Status: DISCONTINUED | OUTPATIENT
Start: 2023-04-12 | End: 2023-04-14 | Stop reason: HOSPADM

## 2023-04-12 RX ORDER — HALOPERIDOL 5 MG/1
5 TABLET ORAL EVERY 4 HOURS PRN
Status: DISCONTINUED | OUTPATIENT
Start: 2023-04-12 | End: 2023-04-14 | Stop reason: HOSPADM

## 2023-04-12 RX ORDER — LORAZEPAM 2 MG/1
2 TABLET ORAL EVERY 4 HOURS PRN
Status: DISCONTINUED | OUTPATIENT
Start: 2023-04-12 | End: 2023-04-14 | Stop reason: HOSPADM

## 2023-04-12 RX ORDER — ATORVASTATIN CALCIUM 40 MG/1
40 TABLET, FILM COATED ORAL NIGHTLY
Status: DISCONTINUED | OUTPATIENT
Start: 2023-04-13 | End: 2023-04-14 | Stop reason: HOSPADM

## 2023-04-12 RX ORDER — NICOTINE 21 MG/24HR
1 PATCH, TRANSDERMAL 24 HOURS TRANSDERMAL DAILY PRN
Status: DISCONTINUED | OUTPATIENT
Start: 2023-04-12 | End: 2023-04-14 | Stop reason: HOSPADM

## 2023-04-12 RX ORDER — SODIUM CHLORIDE 0.9 % (FLUSH) 0.9 %
10 SYRINGE (ML) INJECTION AS NEEDED
Status: DISCONTINUED | OUTPATIENT
Start: 2023-04-12 | End: 2023-04-12 | Stop reason: HOSPADM

## 2023-04-12 RX ORDER — DIPHENHYDRAMINE HYDROCHLORIDE 50 MG/ML
50 INJECTION INTRAMUSCULAR; INTRAVENOUS EVERY 4 HOURS PRN
Status: DISCONTINUED | OUTPATIENT
Start: 2023-04-12 | End: 2023-04-14 | Stop reason: HOSPADM

## 2023-04-12 RX ORDER — ALUMINA, MAGNESIA, AND SIMETHICONE 2400; 2400; 240 MG/30ML; MG/30ML; MG/30ML
15 SUSPENSION ORAL EVERY 6 HOURS PRN
Status: DISCONTINUED | OUTPATIENT
Start: 2023-04-12 | End: 2023-04-14 | Stop reason: HOSPADM

## 2023-04-12 RX ORDER — LORAZEPAM 2 MG/ML
2 INJECTION INTRAMUSCULAR EVERY 4 HOURS PRN
Status: DISCONTINUED | OUTPATIENT
Start: 2023-04-12 | End: 2023-04-14 | Stop reason: HOSPADM

## 2023-04-12 RX ORDER — ASPIRIN 81 MG/1
81 TABLET ORAL DAILY
Status: DISCONTINUED | OUTPATIENT
Start: 2023-04-13 | End: 2023-04-14 | Stop reason: HOSPADM

## 2023-04-12 RX ORDER — LOPERAMIDE HYDROCHLORIDE 2 MG/1
2 CAPSULE ORAL
Status: DISCONTINUED | OUTPATIENT
Start: 2023-04-12 | End: 2023-04-14 | Stop reason: HOSPADM

## 2023-04-12 RX ORDER — HYDROXYZINE PAMOATE 50 MG/1
50 CAPSULE ORAL EVERY 6 HOURS PRN
Status: DISCONTINUED | OUTPATIENT
Start: 2023-04-12 | End: 2023-04-14 | Stop reason: HOSPADM
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
## 2023-04-13 PROBLEM — F15.90 AMPHETAMINE MISUSE: Status: ACTIVE | Noted: 2023-04-13

## 2023-04-13 PROBLEM — F43.10 POST TRAUMATIC STRESS DISORDER (PTSD): Status: ACTIVE | Noted: 2023-04-13

## 2023-04-13 PROBLEM — F12.10 MARIJUANA ABUSE: Status: ACTIVE | Noted: 2023-04-13

## 2023-04-13 RX ORDER — ARIPIPRAZOLE 15 MG/1
15 TABLET ORAL DAILY
Status: DISCONTINUED | OUTPATIENT
Start: 2023-04-13 | End: 2023-04-14 | Stop reason: HOSPADM

## 2023-04-13 RX ADMIN — HYDROXYZINE PAMOATE 50 MG: 50 CAPSULE ORAL at 00:07

## 2023-04-13 RX ADMIN — ARIPIPRAZOLE 15 MG: 15 TABLET ORAL at 10:43

## 2023-04-13 RX ADMIN — ASPIRIN 81 MG: 81 TABLET, COATED ORAL at 09:44

## 2023-04-13 RX ADMIN — ATORVASTATIN CALCIUM 40 MG: 40 TABLET, FILM COATED ORAL at 21:26
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
## 2023-04-13 NOTE — NURSING NOTE
"Pt arrived voluntarily to unit at 2330. Pt has been staying at [facility]. He checked out of [facility] and began having suicidal thoughts, so he cut himself superficially on the L forearm with a dull knife. Pt appears to be fearful and paranoid, but is able to be redirected. He is cooperative and pleasant with staff. Pt reports that his hopes for this admission is to get his \"brain checked out\" and stabilized on medications. Pt is currently homeless and uses meth daily. Pt denies current SI/HI or hallucinations. He rates anxiety 7, depression 0. Pt hopes to return to [facility] upon discharge.   "

## 2023-04-13 NOTE — PLAN OF CARE
Goal Outcome Evaluation:  Plan of Care Reviewed With: patient  Patient Agreement with Plan of Care: agrees         Pt is alert and oriented and able to make needs known.  Pt denies SI and HI.  Pt denies a/v/h.  Pt has been cooperative with all meds and assessments today.  Pt has primarily been withdrawn to room today.  Pt has eaten meals.  Pt exhibits no s/s of acute distress at this time.

## 2023-04-13 NOTE — ED NOTES
[facility] stated to hold off sending pt upstairs for about 15 min at this time, d/t staffing.

## 2023-04-14 VITALS
HEART RATE: 70 BPM | SYSTOLIC BLOOD PRESSURE: 135 MMHG | RESPIRATION RATE: 16 BRPM | OXYGEN SATURATION: 100 % | HEIGHT: 73 IN | DIASTOLIC BLOOD PRESSURE: 87 MMHG | TEMPERATURE: 97.9 F | WEIGHT: 193 LBS | BODY MASS INDEX: 25.58 KG/M2

## 2023-04-14 RX ORDER — ARIPIPRAZOLE 15 MG/1
15 TABLET ORAL DAILY
Qty: 30 TABLET | Refills: 1 | Status: SHIPPED | OUTPATIENT
Start: 2023-04-15

## 2023-04-14 RX ORDER — ATORVASTATIN CALCIUM 40 MG/1
40 TABLET, FILM COATED ORAL NIGHTLY
Qty: 30 TABLET | Refills: 1 | Status: SHIPPED | OUTPATIENT
Start: 2023-04-14

## 2023-04-14 RX ORDER — ASPIRIN 81 MG/1
81 TABLET ORAL DAILY
Qty: 30 TABLET | Refills: 1 | Status: SHIPPED | OUTPATIENT
Start: 2023-04-15

## 2023-04-14 RX ADMIN — HYDROXYZINE PAMOATE 50 MG: 50 CAPSULE ORAL at 02:41

## 2023-04-14 RX ADMIN — ACETAMINOPHEN 650 MG: 325 TABLET ORAL at 02:41

## 2023-04-14 RX ADMIN — ARIPIPRAZOLE 15 MG: 15 TABLET ORAL at 11:02

## 2023-04-14 RX ADMIN — ASPIRIN 81 MG: 81 TABLET, COATED ORAL at 11:03

## 2023-04-14 NOTE — DISCHARGE SUMMARY
[facility]         DISCHARGE SUMMARY    Patient Name: Arun Anaya  : 1985  MRN: [id number]    Date of Admission: 2023  Date of Discharge: 2023  Primary Care Physician: Provider, No Known    Consults     No orders found from 3/14/2023 to 2023.          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         Hospital Course     Hospital Course:  Arun Anaya is a 38 y.o. male admitted on a voluntary basis for depression with suicidal ideations.  Patient also with a long history of substance use and recently relapsed on methamphetamine and marijuana.  Patient had been at [facility] and left and relapsed.  He presented back to [facility] found to be depressed with suicidal ideations since emergency room for evaluation and admitted to [facility].    Patient reports not doing well on antidepressants in the past.  Reports he has a history of remote psychosis.  He was agreeable to a trial of aripiprazole and this was started at 15 mg.  The patient is tolerating medication well with no side effects.    He was denying suicidal ideation on initial evaluation.  Has been free of suicidal ideations throughout his stay.  He has been calm and cooperative.  Patient continued to appear somewhat dejected and flat in his affect but was appropriate and calm.  He was up and out in the milieu with peers.  He had no acute agitation did not require any extra medications for agitation.  He did not require any medicines to help with sleep.  Patient does acknowledge needing  "to get himself together and remaining clean and sober.  Patient reports he has numerous social and personal issues but feels that he can handle them better with sobriety.  He also wants to remain on medication.    On admission the patient had a very small, barely visible scratches to his left wrist from what he reported to be a suicide attempt.  He has had no further suicidal ideation since he came into the hospital.  He reports he feels much better now.  Patient reports that he needs a challenge for his brain and is preferred to go back to [facility].  He reports that he feels better and feels like there is help for him and wants to get back to sobriety.    Date of discharge the patient is calm, cooperative, future oriented and goal directed.  There is no acute psychomotor restlessness or agitation.  Continues to make fair, but limited eye contact.  Speaks in a low tone but is appropriate engaging.  Speech is decreased tone but normal rate.  Language is appropriate relevant.  Mood is described as \"not depressed since I have been here\" but his affect is rather constricted.  Thought processes are sequential and goal directed.  Thought content is negative for suicidal homicidal ideation.  There is no auditory visualizations.  Insight and judgment are intact.    Patient wanted to get back to drug and alcohol rehabilitation facilities been contacted and he is able to return there today.        DISCHARGE Follow Up Recommendations for labs and diagnostics: Routine health maintenance with primary care, [facility], drug and alcohol rehabilitation      Day of Discharge     Vital Signs:  Temp:  [97.9 °F (36.6 °C)-98.5 °F (36.9 °C)] 97.9 °F (36.6 °C)  Heart Rate:  [70-78] 70  Resp:  [16] 16  BP: (129-135)/(84-87) 135/87      Pertinent  and/or Most Recent Results     LAB RESULTS:      Lab 04/12/23  1850   WBC 8.87   HEMOGLOBIN 16.3   HEMATOCRIT 48.4   PLATELETS 243   NEUTROS ABS 4.51   IMMATURE GRANS (ABS) 0.02 "   LYMPHS ABS 3.29*   MONOS ABS 0.77   EOS ABS 0.21   MCV 87.8         Lab 04/12/23  1850   SODIUM 141   POTASSIUM 5.0   CHLORIDE 102   CO2 30.0*   ANION GAP 9.0   BUN 13   CREATININE 0.77   EGFR 118.3   GLUCOSE 99   CALCIUM 10.4         Lab 04/12/23  1850   TOTAL PROTEIN 7.2   ALBUMIN 4.4   GLOBULIN 2.8   ALT (SGPT) 13   AST (SGOT) 16   BILIRUBIN 0.6   ALK PHOS 90                                     Lab 04/12/23  1850   ETHANOL PCT <0.010   ETHANOL MGDL <10         Lab 04/12/23  1904   AMPH/METHAM SCREEN, URINE Positive*   BENZODIAZEPINE SCREEN, URINE Negative   COCAINE SCREEN, URINE Negative   OPIATES Negative   THC URINE SCREEN Positive*   METHADONE SCREEN, URINE Negative     Brief Urine Lab Results  (Last result in the past 365 days)      Color   Clarity   Blood   Leuk Est   Nitrite   Protein   CREAT   Urine HCG        01/07/23 1737           7.2                                     Imaging Results (Last 7 Days)     ** No results found for the last 168 hours. **           Labs Pending at Discharge:           Discharge Details        Discharge Medications      New Medications      Instructions Start Date   ARIPiprazole 15 MG tablet  Commonly known as: ABILIFY   15 mg, Oral, Daily   Start Date: April 15, 2023        Changes to Medications      Instructions Start Date   aspirin 81 MG EC tablet  What changed: additional instructions   81 mg, Oral, Daily   Start Date: April 15, 2023     atorvastatin 40 MG tablet  Commonly known as: LIPITOR  What changed:   · when to take this  · additional instructions   40 mg, Oral, Nightly             No Known Allergies      Discharge Disposition:  [facility] or Unit (DC - External)    Diet:  Hospital:  Diet Order   Procedures   • Diet: Regular/House Diet; Texture: Regular Texture (IDDSI 7); Fluid Consistency: Thin (IDDSI 0)         Discharge Activity:   Activity Instructions     Activity as Tolerated            Discharge Condition: Stable    CODE STATUS:  Code Status and  Medical Interventions:   Ordered at: 04/12/23 2237     Code Status (Patient has no pulse and is not breathing):    CPR (Attempt to Resuscitate)     Medical Interventions (Patient has pulse or is breathing):    Full Support         No future appointments.    Additional Instructions for the Follow-ups that You Need to Schedule     Discharge Follow-up with PCP   As directed       Currently Documented PCP:    Provider, No Known    PCP Phone Number:    None     Follow Up Details: As needed         Discharge Follow-up with Specified Provider: [facility]   As directed      To: [facility]         Discharge Follow-up with Specified Provider: [facility]   As directed      To: [facility]               Time spent on Discharge including face to face service: 30 minutes    Part of this note may be an electronic transcription/translation of spoken language to printed text using the Dragon dictation system.        Electronically signed by Gabriel Perez MD, 04/14/23, 1:21 PM EDT.

## 2023-04-14 NOTE — PLAN OF CARE
Goal Outcome Evaluation:  Plan of Care Reviewed With: patient  Patient Agreement with Plan of Care: agrees     Progress: improving    Patient has been withdrawn to room except to make needs known or requests.  Patient has been able to sleep, but it has been broken due to a headache and anxiety.  Patient did take Tylenol and Vistaril and was able to get back to sleep.  Patient stated he thought he would have difficulty due to sleeping all day.  Patient denies S/I H/I, and A/V/H, and CFS.  Patient rated depression at 0/10, and anxiety at 9/10.  Patient unable to identify a trigger or cause of his anxiety.  Patient is unsure of exact goals, but then asks when he might be discharged. Emotional support and safe environment provided.

## 2023-04-14 NOTE — SIGNIFICANT NOTE
04/14/23 1824   Plan   Plan One time provision of medications done for this pt upon discharge from [facility].

## 2023-04-19 ENCOUNTER — HOSPITAL ENCOUNTER (EMERGENCY)
Facility: HOSPITAL | Age: 38
Discharge: HOME | End: 2023-04-19
Attending: EMERGENCY MEDICINE | Admitting: EMERGENCY MEDICINE
Payer: COMMERCIAL

## 2023-04-19 ENCOUNTER — APPOINTMENT (OUTPATIENT)
Dept: GENERAL RADIOLOGY | Facility: HOSPITAL | Age: 38
End: 2023-04-19
Payer: COMMERCIAL

## 2023-04-19 VITALS
BODY MASS INDEX: 27.67 KG/M2 | WEIGHT: 208.78 LBS | HEART RATE: 93 BPM | TEMPERATURE: 98.1 F | RESPIRATION RATE: 18 BRPM | OXYGEN SATURATION: 100 % | DIASTOLIC BLOOD PRESSURE: 76 MMHG | SYSTOLIC BLOOD PRESSURE: 132 MMHG | HEIGHT: 73 IN

## 2023-04-19 DIAGNOSIS — R07.89 CHEST DISCOMFORT: Primary | ICD-10-CM

## 2023-04-19 LAB
ALBUMIN SERPL-MCNC: 4 G/DL (ref 3.5–5.2)
ALBUMIN/GLOB SERPL: 1.6 G/DL
ALP SERPL-CCNC: 79 U/L (ref 39–117)
ALT SERPL W P-5'-P-CCNC: 10 U/L (ref 1–41)
ANION GAP SERPL CALCULATED.3IONS-SCNC: 10.1 MMOL/L (ref 5–15)
AST SERPL-CCNC: 12 U/L (ref 1–40)
BASOPHILS # BLD AUTO: 0.04 10*3/MM3 (ref 0–0.2)
BASOPHILS NFR BLD AUTO: 0.6 % (ref 0–1.5)
BILIRUB SERPL-MCNC: 0.2 MG/DL (ref 0–1.2)
BUN SERPL-MCNC: 9 MG/DL (ref 6–20)
BUN/CREAT SERPL: 11.8 (ref 7–25)
CALCIUM SPEC-SCNC: 9 MG/DL (ref 8.6–10.5)
CHLORIDE SERPL-SCNC: 104 MMOL/L (ref 98–107)
CO2 SERPL-SCNC: 25.9 MMOL/L (ref 22–29)
CREAT SERPL-MCNC: 0.76 MG/DL (ref 0.76–1.27)
DEPRECATED RDW RBC AUTO: 42.1 FL (ref 37–54)
EGFRCR SERPLBLD CKD-EPI 2021: 118.7 ML/MIN/1.73
EOSINOPHIL # BLD AUTO: 0.15 10*3/MM3 (ref 0–0.4)
EOSINOPHIL NFR BLD AUTO: 2.2 % (ref 0.3–6.2)
ERYTHROCYTE [DISTWIDTH] IN BLOOD BY AUTOMATED COUNT: 13.1 % (ref 12.3–15.4)
GEN 5 2HR TROPONIN T REFLEX: 6 NG/L
GLOBULIN UR ELPH-MCNC: 2.5 GM/DL
GLUCOSE SERPL-MCNC: 95 MG/DL (ref 65–99)
HCT VFR BLD AUTO: 43.6 % (ref 37.5–51)
HGB BLD-MCNC: 14.6 G/DL (ref 13–17.7)
HOLD SPECIMEN: NORMAL
HOLD SPECIMEN: NORMAL
IMM GRANULOCYTES # BLD AUTO: 0.01 10*3/MM3 (ref 0–0.05)
IMM GRANULOCYTES NFR BLD AUTO: 0.1 % (ref 0–0.5)
LIPASE SERPL-CCNC: 27 U/L (ref 13–60)
LYMPHOCYTES # BLD AUTO: 2.29 10*3/MM3 (ref 0.7–3.1)
LYMPHOCYTES NFR BLD AUTO: 33.3 % (ref 19.6–45.3)
MAGNESIUM SERPL-MCNC: 1.8 MG/DL (ref 1.6–2.6)
MCH RBC QN AUTO: 29.5 PG (ref 26.6–33)
MCHC RBC AUTO-ENTMCNC: 33.5 G/DL (ref 31.5–35.7)
MCV RBC AUTO: 88.1 FL (ref 79–97)
MONOCYTES # BLD AUTO: 0.59 10*3/MM3 (ref 0.1–0.9)
MONOCYTES NFR BLD AUTO: 8.6 % (ref 5–12)
NEUTROPHILS NFR BLD AUTO: 3.8 10*3/MM3 (ref 1.7–7)
NEUTROPHILS NFR BLD AUTO: 55.2 % (ref 42.7–76)
NRBC BLD AUTO-RTO: 0 /100 WBC (ref 0–0.2)
NT-PROBNP SERPL-MCNC: 114.2 PG/ML (ref 0–450)
PLATELET # BLD AUTO: 194 10*3/MM3 (ref 140–450)
PMV BLD AUTO: 9.2 FL (ref 6–12)
POTASSIUM SERPL-SCNC: 4.2 MMOL/L (ref 3.5–5.2)
PROT SERPL-MCNC: 6.5 G/DL (ref 6–8.5)
RBC # BLD AUTO: 4.95 10*6/MM3 (ref 4.14–5.8)
SODIUM SERPL-SCNC: 140 MMOL/L (ref 136–145)
TROPONIN T DELTA: 0 NG/L
TROPONIN T SERPL HS-MCNC: 6 NG/L
WBC NRBC COR # BLD: 6.88 10*3/MM3 (ref 3.4–10.8)
WHOLE BLOOD HOLD COAG: NORMAL
WHOLE BLOOD HOLD SPECIMEN: NORMAL

## 2023-04-19 PROCEDURE — 84484 ASSAY OF TROPONIN QUANT: CPT | Performed by: EMERGENCY MEDICINE

## 2023-04-19 PROCEDURE — 85025 COMPLETE CBC W/AUTO DIFF WBC: CPT

## 2023-04-19 PROCEDURE — 80053 COMPREHEN METABOLIC PANEL: CPT | Performed by: EMERGENCY MEDICINE

## 2023-04-19 PROCEDURE — 83735 ASSAY OF MAGNESIUM: CPT | Performed by: EMERGENCY MEDICINE

## 2023-04-19 PROCEDURE — 93005 ELECTROCARDIOGRAM TRACING: CPT

## 2023-04-19 PROCEDURE — 83880 ASSAY OF NATRIURETIC PEPTIDE: CPT

## 2023-04-19 PROCEDURE — 93005 ELECTROCARDIOGRAM TRACING: CPT | Performed by: EMERGENCY MEDICINE

## 2023-04-19 PROCEDURE — 83690 ASSAY OF LIPASE: CPT | Performed by: EMERGENCY MEDICINE

## 2023-04-19 PROCEDURE — 99283 EMERGENCY DEPT VISIT LOW MDM: CPT

## 2023-04-19 PROCEDURE — 36415 COLL VENOUS BLD VENIPUNCTURE: CPT

## 2023-04-19 PROCEDURE — 71045 X-RAY EXAM CHEST 1 VIEW: CPT

## 2023-04-19 RX ORDER — ASPIRIN 81 MG/1
324 TABLET, CHEWABLE ORAL ONCE
Status: DISCONTINUED | OUTPATIENT
Start: 2023-04-19 | End: 2023-04-19 | Stop reason: HOSPADM

## 2023-04-19 RX ORDER — SODIUM CHLORIDE 0.9 % (FLUSH) 0.9 %
10 SYRINGE (ML) INJECTION AS NEEDED
Status: DISCONTINUED | OUTPATIENT
Start: 2023-04-19 | End: 2023-04-19 | Stop reason: HOSPADM

## 2023-04-19 NOTE — ED PROVIDER NOTES
Time: 11:12 AM EDT  Date of encounter:  2023  Independent Historian/Clinical History and Information was obtained by:   Patient  Chief Complaint: chest discomfort    History is limited by: N/A    History of Present Illness:  Patient is a 38 y.o. year old male who presents to the emergency department for evaluation of waxing waning CP. Pt woke up with L-sided CP, SOB and tingling in his L hand. He described the CP as stabbing and burning. Pt does not have CP at this moment. Pt also had CP 2 days ago which lasted 20 minutes, he was also short of breath. His pain does not radiate to the arm, neck, jaw or back. Pt reports SOB for the last few days. He was seen here Thursday for suicidal ideation. Denies nausea, diaphoresis, cough, fever, myalgias. Pt has a hx of MI 2 yrs ago, he had stents placed. Pt has CAD, he takes aspirin. Pt does not see cardiology.   Pt denies hx of DVT or PE. Pt denies hx of cancer. Pt has not had surgery in the last 6 weeks. Denies long plane flights in the last 6 weeks. Pt's O2 sats 100%, HR under 90. PERC score negative. Pt does not take meds for HTN. He takes meds for HLD. Pt smokes cigarettes. Denies diabetes, recent stress test.    HPI    Patient Care Team  Primary Care Provider: Provider, No Known    Past Medical History:     No Known Allergies  Past Medical History:   Diagnosis Date   • Hyperlipidemia    • Hypertension    • Myocardial infarction      Past Surgical History:   Procedure Laterality Date   • CORONARY STENT PLACEMENT     • EAR RECONSTRUCTION       Family History   Problem Relation Age of Onset   • Cancer Mother    • Heart attack Mother    • Other Mother         Amyloidosis   • Depression Mother    • Bipolar disorder Father    • Drug abuse Father    • Suicidality Father          by suicide (hanging) in    • Lupus Maternal Grandmother        Home Medications:  Prior to Admission medications    Medication Sig Start Date End Date Taking? Authorizing Provider  "  ARIPiprazole (ABILIFY) 15 MG tablet Take 1 tablet by mouth Daily. Indications: Major Depressive Disorder 4/15/23   Gabriel Perez MD   aspirin 81 MG EC tablet Take 1 tablet by mouth Daily. Indications: Disease involving Lipid Deposits in the Arteries 4/15/23   Gabriel Perez MD   atorvastatin (LIPITOR) 40 MG tablet Take 1 tablet by mouth Every Night. Indications: Ischemic Heart Disease 4/14/23   Gabriel Perez MD        Social History:   Social History     Tobacco Use   • Smoking status: Every Day     Packs/day: 0.50     Years: 25.00     Pack years: 12.50     Types: Cigarettes   • Smokeless tobacco: Never   Vaping Use   • Vaping Use: Never used   Substance Use Topics   • Alcohol use: Yes     Comment: PT STATES \"I'VE ONLY TAKEN A COUPLE OF SHOTS IN THE LAST 6 MONTHS\"   • Drug use: Yes     Types: Marijuana, Methamphetamines     Comment: PT STATES \"I'VE TAKEN A LITTLE BIT OF EVERYTHING\"         Review of Systems:  Review of Systems     Physical Exam:  /76   Pulse 93   Temp 98.1 °F (36.7 °C) (Oral)   Resp 18   Ht 185.4 cm (73\")   Wt 94.7 kg (208 lb 12.4 oz)   SpO2 100%   BMI 27.54 kg/m²     Physical Exam  Vitals and nursing note reviewed.   Constitutional:       General: He is not in acute distress.     Appearance: Normal appearance. He is not ill-appearing, toxic-appearing or diaphoretic.   HENT:      Head: Normocephalic and atraumatic.      Mouth/Throat:      Mouth: Mucous membranes are moist.   Eyes:      Pupils: Pupils are equal, round, and reactive to light.   Cardiovascular:      Rate and Rhythm: Normal rate and regular rhythm.      Pulses: Normal pulses.           Dorsalis pedis pulses are 2+ on the right side and 2+ on the left side.        Posterior tibial pulses are 2+ on the right side and 2+ on the left side.      Heart sounds: Normal heart sounds. No murmur heard.     Comments: Good distal pulses  Pulmonary:      Effort: Pulmonary effort is normal. No accessory muscle usage, respiratory distress or " retractions.      Breath sounds: Normal breath sounds. No wheezing, rhonchi or rales.   Abdominal:      General: Abdomen is flat. There is no distension.      Palpations: Abdomen is soft. There is no mass or pulsatile mass.      Tenderness: There is no abdominal tenderness. There is no right CVA tenderness, left CVA tenderness, guarding or rebound.      Comments: No rigidity   Musculoskeletal:         General: No swelling, tenderness or deformity.      Cervical back: Neck supple. No tenderness.      Right lower leg: No edema.      Left lower leg: No edema.   Skin:     General: Skin is warm and dry.      Capillary Refill: Capillary refill takes less than 2 seconds.      Coloration: Skin is not jaundiced or pale.      Findings: No erythema.   Neurological:      General: No focal deficit present.      Mental Status: He is alert and oriented to person, place, and time. Mental status is at baseline.      Cranial Nerves: Cranial nerves 2-12 are intact. No cranial nerve deficit.      Sensory: Sensation is intact. No sensory deficit.      Motor: Motor function is intact. No weakness or pronator drift.      Coordination: Coordination is intact. Coordination normal.   Psychiatric:         Mood and Affect: Mood normal.         Behavior: Behavior normal.                  Procedures:  Procedures      Medical Decision Making:      Comorbidities that affect care:    Hyperlipidemia, Myocardial Infarction, Coronary Artery Disease, Hypertension, Smoking    External Notes reviewed:    None      The following orders were placed and all results were independently analyzed by me:  Orders Placed This Encounter   Procedures   • XR Chest 1 View   • [place] Draw   • High Sensitivity Troponin T   • Comprehensive Metabolic Panel   • Lipase   • BNP   • Magnesium   • CBC Auto Differential   • High Sensitivity Troponin T 2Hr   • NPO Diet NPO Type: Strict NPO   • Undress & Gown   • Cardiac Monitoring   • Continuous Pulse Oximetry   • General MD  Inpatient Consult   • Oxygen Therapy- Nasal Cannula; 2 LPM; Titrate for SPO2: equal to or greater than, 92%   • ECG 12 Lead ED Triage Standing Order; Chest Pain   • ECG 12 Lead ED Triage Standing Order; Chest Pain   • Insert Peripheral IV   • CBC & Differential   • Green Top (Gel)   • Lavender Top   • Gold Top - SST   • Light Blue Top       Medications Given in the Emergency Department:  Medications   sodium chloride 0.9 % flush 10 mL (has no administration in time range)   aspirin chewable tablet 324 mg (has no administration in time range)        ED Course:    ED Course as of 04/19/23 1525   Wed Apr 19, 2023   1033 EKG:    Rhythm: Sinus rhythm  Rate: 90  Left bundle branch block present  Intervals: Normal NE and QT interval  T-wave: T wave inversion in V4, V5, V6, I, II, 3, aVF, consistent with a left bundle branch block  ST Segment: Nonspecific ST segments V1, V2, V4, V5, V6, I, II, III, aVF, most consistent with the left bundle branch block  The patient does not meet Sgarbossa criteria    EKG Comparison: No EKG available for comparison    Interpreted by me   [SD]   1432 EKG:    Rhythm: Sinus rhythm  Rate: 90  Left bundle branch block  Intervals: Normal NE and QT interval  T-wave: T wave inversion V5, V6, I, II, III, aVF  ST Segment: Nonspecific ST abnormality V1, V2, V3, V6, I, II III, aVF, thought to be related to the left bundle branch block, no obvious pathological ST elevation or reciprocal ST depression to suggest acute myocardial infarction  Sgarbossa criteria is not met    EKG Comparison: EKG is unchanged from EKG performed earlier in the department    Interpreted by me   [SD]   1518 15:18 EDT Updated patient on results and plan for discharge. Patient expressed understanding and agreement. All questions answered at this time.  [TW]      ED Course User Index  [SD] Dusty Rutherford DO  [TW] Rubina Ramirez       Labs:    Lab Results (last 24 hours)     Procedure Component Value Units Date/Time    High  Sensitivity Troponin T [[id number]]  (Normal) Collected: 04/19/23 1040    Specimen: Blood Updated: 04/19/23 1108     HS Troponin T 6 ng/L     Narrative:      High Sensitive Troponin T Reference Range:  <10.0 ng/L- Negative Female for AMI  <15.0 ng/L- Negative Male for AMI  >=10 - Abnormal Female indicating possible myocardial injury.  >=15 - Abnormal Male indicating possible myocardial injury.   Clinicians would have to utilize clinical acumen, EKG, Troponin, and serial changes to determine if it is an Acute Myocardial Infarction or myocardial injury due to an underlying chronic condition.         CBC & Differential [[id number]]  (Normal) Collected: 04/19/23 1040    Specimen: Blood Updated: 04/19/23 1048    Narrative:      The following orders were created for panel order CBC & Differential.  Procedure                               Abnormality         Status                     ---------                               -----------         ------                     CBC Auto Differential[[id number]]        Normal              Final result                 Please view results for these tests on the individual orders.    Comprehensive Metabolic Panel [[id number]] Collected: 04/19/23 1040    Specimen: Blood Updated: 04/19/23 1108     Glucose 95 mg/dL      BUN 9 mg/dL      Creatinine 0.76 mg/dL      Sodium 140 mmol/L      Potassium 4.2 mmol/L      Chloride 104 mmol/L      CO2 25.9 mmol/L      Calcium 9.0 mg/dL      Total Protein 6.5 g/dL      Albumin 4.0 g/dL      ALT (SGPT) 10 U/L      AST (SGOT) 12 U/L      Alkaline Phosphatase 79 U/L      Total Bilirubin 0.2 mg/dL      Globulin 2.5 gm/dL      A/G Ratio 1.6 g/dL      BUN/Creatinine Ratio 11.8     Anion Gap 10.1 mmol/L      eGFR 118.7 mL/min/1.73     Narrative:      GFR Normal >60  Chronic Kidney Disease <60  Kidney Failure <15      Lipase [[id number]]  (Normal) Collected: 04/19/23 1040    Specimen: Blood Updated: 04/19/23 1108     Lipase 27 U/L     BNP [[id number]]  (Normal)  Collected: 04/19/23 1040    Specimen: Blood Updated: 04/19/23 1105     proBNP 114.2 pg/mL     Narrative:      Among patients with dyspnea, NT-proBNP is highly sensitive for the detection of acute congestive heart failure. In addition NT-proBNP of <300 pg/ml effectively rules out acute congestive heart failure with 99% negative predictive value.      Magnesium [[id number]]  (Normal) Collected: 04/19/23 1040    Specimen: Blood Updated: 04/19/23 1108     Magnesium 1.8 mg/dL     CBC Auto Differential [[id number]]  (Normal) Collected: 04/19/23 1040    Specimen: Blood Updated: 04/19/23 1048     WBC 6.88 10*3/mm3      RBC 4.95 10*6/mm3      Hemoglobin 14.6 g/dL      Hematocrit 43.6 %      MCV 88.1 fL      MCH 29.5 pg      MCHC 33.5 g/dL      RDW 13.1 %      RDW-SD 42.1 fl      MPV 9.2 fL      Platelets 194 10*3/mm3      Neutrophil % 55.2 %      Lymphocyte % 33.3 %      Monocyte % 8.6 %      Eosinophil % 2.2 %      Basophil % 0.6 %      Immature Grans % 0.1 %      Neutrophils, Absolute 3.80 10*3/mm3      Lymphocytes, Absolute 2.29 10*3/mm3      Monocytes, Absolute 0.59 10*3/mm3      Eosinophils, Absolute 0.15 10*3/mm3      Basophils, Absolute 0.04 10*3/mm3      Immature Grans, Absolute 0.01 10*3/mm3      nRBC 0.0 /100 WBC     High Sensitivity Troponin T 2Hr [[id number]]  (Normal) Collected: 04/19/23 1253    Specimen: Blood Updated: 04/19/23 1314     HS Troponin T 6 ng/L      Troponin T Delta 0 ng/L     Narrative:      High Sensitive Troponin T Reference Range:  <10.0 ng/L- Negative Female for AMI  <15.0 ng/L- Negative Male for AMI  >=10 - Abnormal Female indicating possible myocardial injury.  >=15 - Abnormal Male indicating possible myocardial injury.   Clinicians would have to utilize clinical acumen, EKG, Troponin, and serial changes to determine if it is an Acute Myocardial Infarction or myocardial injury due to an underlying chronic condition.                Imaging:    XR Chest 1 View    Result Date:  4/19/2023  PROCEDURE: XR CHEST 1 VW  COMPARISON: [facility], CR, CHEST AP/PA 1 VIEW, 3/14/2021, 8:40.  INDICATIONS: CHEST PAIN AND TINGLING IN LEFT HAND.  FINDINGS:  Heart size and pulmonary vessels are within normal limits.  Lungs are clear bilaterally.  There are no pleural effusions.  No evidence pneumothorax.        1. No acute cardiopulmonary disease.       TAMERA ERICKSON MD       Electronically Signed and Approved By: TAMERA ERICKSON MD on 4/19/2023 at 11:14                 Differential Diagnosis and Discussion:    Chest Pain:  Based on the patient's signs and symptoms, I considered aortic dissection, myocardial infaction, pulmonary embolism, cardiac tamponade, pericarditis, pneumothorax, musculoskeletal chest pain and other differential diagnosis as an etiology of the patient's chest pain.     All labs were reviewed and interpreted by me.  All X-rays impressions were independently interpreted by me.  EKG was interpreted by me.    MDM  Number of Diagnoses or Management Options  Chest discomfort  Diagnosis management comments: PERC Rule for Pulmonary Embolism from [employer]  on 4/19/2023  ** All calculations should be rechecked by clinician prior to use **    RESULT SUMMARY:  0 criteria  No need for further workup, as <2% chance of PE.    If no criteria are positive and clinician's pre-test probability is <15%, PERC Rule criteria are satisfied.      The patient's PERC score was negative.  I have discussed with the patient that they have a very low risk for pulmonary embolism.  I have discussed possibly performing a CAT scan of the chest with IV contrast to rule out pulmonary embolism.  However, due to the fact that the patient is very low risk for pulmonary embolism, they would prefer not to have a CAT scan of the chest at this time due to radiation exposure.      The patient's PERC score was negative.  I have discussed with the patient that they have a very low risk for pulmonary embolism.  I  have discussed possibly performing a CAT scan of the chest with IV contrast to rule out pulmonary embolism.  However, due to the fact that the patient is very low risk for pulmonary embolism, they would prefer not to have a CAT scan of the chest at this time due to radiation exposure.      HEART Score for Major Cardiac Events - MD[employer]  Calculated on Apr 19 2023 3:21 PM  3 points -> Low Score (0-3 points) Risk of MACE of 0.9-1.7%.    The patient had 2 normal high-sensitivity troponin while in the emergency room    The patient appears well, in no distress and nontoxic.  The patient is resting comfortably.  The patient has a low heart score.  I have discussed the significance of the heart score with them.  The patient understands that they have a low risk for cardiovascular event over the next 6 weeks.  Understanding the risks, they feel comfortable to be discharged home and to follow-up with the cardiologist on an outpatient basis for stress test.   In the interim, the patient does understand should they develop worsening chest pain, near syncope, syncope, extreme fatigue, worsening shortness of breath or diaphoresis to return back to emergency room immediately.       Amount and/or Complexity of Data Reviewed  Clinical lab tests: reviewed  Tests in the radiology section of CPT®: reviewed  Tests in the medicine section of CPT®: reviewed  Decide to obtain previous medical records or to obtain history from someone other than the patient: yes (I reviewed the patient's heart catheterization from March 17, 2021 and I have enclosed the report    Patient Name:                   Attending Physician:    Arun Anaya M.D.         Patient Visit # MR #            Admit Date  Disch Date     Location    [id number]    [id number]      03/14/2021  03/15/2021     PCU- -         Date of Birth    1985    __________________________________________________________________________    1061 - CARDIAC  CATHETERIZATION         DATE OF PROCEDURE:    03/14/2021         INDICATION:    Non-ST elevation myocardial infarction (NSTEMI), BERNARDINO risk score = 3.         PROCEDURE PERFORMED:    1.  Ultrasound-guided right radial arterial access.    2.  Left heart catheterization.    3.  Selective coronary angiography.    4.  Left ventriculogram.    5.  Percutaneous coronary intervention to the culprit first obtuse marginal        branch of the circumflex using a Xience Samanta 2.5/15 mm drug-eluting        stent.    6.  Moderate sedation.         DESCRIPTION OF PROCEDURE:    After informed consent was obtained, Mr. Anaya was brought to the Cath Lab in    a fasting state and was prepped and draped in sterile fashion. Sonographic    images were obtained of the right wrist and revealed a patent right radial    artery. The radial artery was then cannulated using a micropuncture needle    under direct ultrasound guidance, and a 6-Samoan Terumo Slender sheath was    inserted over a guidewire. An antispasmodic cocktail was administered through    the radial sheath, including 5 mg of verapamil and 100 mcg of nitroglycerin.    5-Samoan catheters were then used for left and right coronary angiography and    left ventriculography. A TIG-4 catheter was used to cannulate the left    coronary system and the right coronary artery. An angled pigtail catheter was    then advanced into the left ventricle and hemodynamic measurements were    obtained. Left ventriculography was performed in the HARGR[place] projection using a    power injector. Pullback pressures were measured across the aortic valve. The    pigtail catheter was then removed, and the angiographic data was carefully    reviewed.         The decision was made to proceed with urgent percutaneous coronary    intervention to the culprit 95% stenosis in the ostial segment of the OM1    branch of the circumflex. Further anticoagulation was administered with    intravenous heparin and an ACT was  obtained. A 6-Emirati XB 3.5 guiding    catheter was inserted and coaxially engaged into the ostium of the left main    coronary artery. Under fluoroscopic guidance, a Runthrough guidewire was    advanced across the OM1 lesion and into the distal segment of the vessel. A    0.014 Spectre guidewire was then inserted and advanced into the distal    segment of the OM2 to protect this vessel. Initial PTCA was then performed to    the lesion using a Trek 2.25/12 mm balloon inflated to 14 atmospheres. A    Xience 2.5/15 mm drug-eluting stent was then deployed at 13 atmospheres    across the residual lesion. The stent balloon and both guidewires were    removed, and completion angiograms were then obtained in orthogonal    projections. These demonstrated good angiographic results with 0% residual    stenosis and no evidence of complications. The guiding catheter was then    removed. The radial sheath was removed, and hemostasis was obtained with a    TR Band. Mr. Anaya was taken to the recovery unit in stable and improved    condition. There were no immediate complications.         RESULTS OF PROCEDURE/FINDINGS:    A) HEMODYNAMICS:    1.  Opening aortic pressure was 124/90.  Closing aortic pressure was 133/84.    2.  Left ventricular pressure was 123/0 and left ventricular end-diastolic        pressure was 13 mmHg.    3.  There was no gradient upon pullback across the aortic valve.         B)  CORONARY ANATOMY:    1.  Left main is a 5.0-5.5 mm diameter vessel that bifurcates distally into        the left anterior descending and left circumflex artery. The left main        appeared angiographically normal throughout. Good reflux of contrast was        observed from the left main ostium. There was no ventricularization or        dampening of the pressure waveform upon engagement of the left main.    2.  Left anterior descending (LAD) is a moderate size vessel that courses        along the anterior intraventricular groove,  tapering distally and        terminating as a recurrent apical branch on the distal inferior wall. Mild        luminal irregularities were observed in the proximal to mid left anterior        descending. The distal left anterior descending appeared angiographically        normal. The left anterior descending gives rise to one moderate sized        diagonal branch and multiple septal perforators, all of which appeared        free of significant disease.    3.  Circumflex is a moderate size, nondominant vessel that gives rise to two        obtuse marginal branches distally. There was a focal 95% ostial stenosis        in the OM1 branch, which represented the culprit lesion for the patient's        NSTEMI. Following percutaneous coronary intervention as described        above, there was 0% residual stenosis with no evidence of complications.        The remainder of the circumflex system contained only mild luminal        irregularities.    4.  Right coronary artery (RCA) is a dominant vessel that bifurcates distally        into the posterior descending artery and posterolateral branch. There was        10-20% stenosis in the distal right coronary artery, but the remainder        of the right coronary artery system appeared angiographically normal.         C)  LEFT VENTRICULOGRAM:        Normal left ventricular systolic function with an estimated ejection        fraction of 60-65% and a small area of inferolateral hypokinesis.  There        was no evidence of significant mitral regurgitation.  The aortic root        is normal size.         TOTAL CONTRAST:    176 mL.         RADIATION DOSE:    Fluoroscopy time 9.8 minutes.  Air Kerma 1,906 mGy.         CONCLUSION:    1.  Successful percutaneous coronary intervention to the culprit OM1 branch        to the circumflex using a Xience Samanta 2.5/15 mm drug-eluting stent.        Preprocedure 95% stenosis, postprocedure 0% residual stenosis. BERNARDINO-3        flow was present pre-  and post-procedure. Type B lesion.  No evidence of        complications.    2.  Normal left ventricular systolic function with an estimated ejection of        60-65% and a small area of inferolateral hypokinesis.    3.  Normal left ventricular end-diastolic pressure.         RECOMMENDATIONS:    Continue routine post-interventional care and monitoring, as well as    post-procedure IV fluid hydration for prophylaxis against contrast-induced    nephropathy. Mr. Anaya will require dual antiplatelet therapy with Aspirin    and Brilinta for a minimum of 1 year. Initiate high-intensity statin therapy    and low dose metoprolol for his coronary artery disease. Secondary prevention    instructions were reviewed in detail with the patient, and tobacco cessation    was strongly recommended.         To be electronically signed in [employer]    [id number] MINDY GREY M.D.         BP:    D:  03/17/2021 11:29    T:  03/17/2021 13:50    #[id number]         Electronically signed by MINDY GREY MD  03/30/2021 12:29      )  Discuss the patient with other providers: yes (15:21 EDT   I discussed the case with Dr. Dusty Lino who is on-call for cardiology.  We have discussed the patient's past medical history, we have reviewed the patient's past heart catheterization, we have reviewed the patient's 2 EKGs while in the emergency room, we have discussed the patient's high-sensitivity troponins.  He feels that the patient has had 2 normal high-sensitivity troponins and the patient is low risk for cardiovascular event over the next 6 weeks.  He feels the patient can be sent home, continued on her aspirin and have an outpatient stress test.  He request the patient call the office tomorrow to set up a stress test.  I have discussed this with the patient in detail, he has voiced understanding he feels comfortable going home.  He will call the cardiologist office tomorrow.  He will schedule a stress test.)             Social Determinants of  Health:    Patient is independent, reliable, and has access to care.       Disposition and Care Coordination:    Discharged: I considered escalation of care by admitting this patient for observation, however the patient has improved and is suitable and  stable for discharge.    I have explained discharge medications and the need for follow up with the patient/caretakers. This was also printed in the discharge instructions. Patient was discharged with the following medications and follow up:      Medication List      No changes were made to your prescriptions during this visit.      Dusty Lino MD  [address] [place], [place] [id number]  [phone]    On 4/20/2023  Chest discomfort, call for appointment       Final diagnoses:   Chest discomfort        ED Disposition     ED Disposition   Discharge    Condition   Stable    Comment   --             This medical record created using voice recognition software.    Documentation assistance provided by Rubina Ramirez acting as scribe for Dusty Rutherford DO. Information recorded by the scribe was done at my direction and has been verified and validated by me.          Rubina Ramirez  04/19/23 5937       Dusty Rutherford DO  04/19/23 8832

## 2023-04-19 NOTE — SIGNIFICANT NOTE
04/19/23 1547   Plan   Plan Comments PERRI contacted Randy from [facility] who will plan to send someone to transport patient back to facility.   Final Discharge Disposition Code 62 - inpatient rehab facility

## 2023-04-19 NOTE — DISCHARGE INSTRUCTIONS
No strenuous activity until released by the cardiologist.  Please continue your daily baby aspirin.  Please stop smoking . Please return to the emergency room for worsening chest pain, radiating chest pain, shortness of breath, near passing out, passing out, extreme fatigue, extreme sweating, nausea or vomiting or new or worrisome symptoms

## 2023-04-30 LAB
QT INTERVAL: 380 MS
QT INTERVAL: 390 MS

## 2024-07-20 ENCOUNTER — APPOINTMENT (OUTPATIENT)
Dept: GENERAL RADIOLOGY | Facility: HOSPITAL | Age: 39
DRG: 885 | End: 2024-07-20
Payer: MEDICAID

## 2024-07-20 ENCOUNTER — HOSPITAL ENCOUNTER (EMERGENCY)
Facility: HOSPITAL | Age: 39
Discharge: PSYCHIATRIC OR SUBSTANCE USE FACILITY | DRG: 885 | End: 2024-07-21
Attending: EMERGENCY MEDICINE
Payer: MEDICAID

## 2024-07-20 VITALS
HEART RATE: 92 BPM | SYSTOLIC BLOOD PRESSURE: 131 MMHG | WEIGHT: 211.42 LBS | DIASTOLIC BLOOD PRESSURE: 77 MMHG | OXYGEN SATURATION: 100 % | RESPIRATION RATE: 18 BRPM | TEMPERATURE: 97.9 F | BODY MASS INDEX: 28.02 KG/M2 | HEIGHT: 73 IN

## 2024-07-20 DIAGNOSIS — S92.001A CLOSED DISPLACED FRACTURE OF RIGHT CALCANEUS, UNSPECIFIED PORTION OF CALCANEUS, INITIAL ENCOUNTER: Primary | ICD-10-CM

## 2024-07-20 DIAGNOSIS — S61.512A SELF-INFLICTED LACERATION OF LEFT WRIST: ICD-10-CM
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
## 2024-07-20 DIAGNOSIS — R45.851 SUICIDAL IDEATION: ICD-10-CM

## 2024-07-20 PROCEDURE — 99285 EMERGENCY DEPT VISIT HI MDM: CPT

## 2024-07-20 PROCEDURE — 73610 X-RAY EXAM OF ANKLE: CPT

## 2024-07-20 PROCEDURE — 93005 ELECTROCARDIOGRAM TRACING: CPT | Performed by: EMERGENCY MEDICINE

## 2024-07-20 PROCEDURE — 93010 ELECTROCARDIOGRAM REPORT: CPT | Performed by: INTERNAL MEDICINE

## 2024-07-20 PROCEDURE — 73620 X-RAY EXAM OF FOOT: CPT

## 2024-07-20 RX ORDER — LIDOCAINE HYDROCHLORIDE AND EPINEPHRINE 10; 10 MG/ML; UG/ML
10 INJECTION, SOLUTION INFILTRATION; PERINEURAL ONCE
Status: COMPLETED | OUTPATIENT
Start: 2024-07-20 | End: 2024-07-21

## 2024-07-20 RX ORDER — GINSENG 100 MG
1 CAPSULE ORAL ONCE
Status: COMPLETED | OUTPATIENT
Start: 2024-07-20 | End: 2024-07-21

## 2024-07-21 ENCOUNTER — HOSPITAL ENCOUNTER (INPATIENT)
Facility: HOSPITAL | Age: 39
LOS: 1 days | Discharge: HOME | End: 2024-07-22
Attending: PSYCHIATRY & NEUROLOGY | Admitting: PSYCHIATRY & NEUROLOGY
Payer: MEDICAID

## 2024-07-21 PROBLEM — S92.301A: Status: ACTIVE | Noted: 2024-07-21

## 2024-07-21 PROBLEM — S51.819A FOREARM LACERATION: Status: ACTIVE | Noted: 2024-07-21

## 2024-07-21 PROBLEM — F32.A DEPRESSION: Status: ACTIVE | Noted: 2024-07-21

## 2024-07-21 LAB
ALBUMIN SERPL-MCNC: 3.7 G/DL (ref 3.5–5.2)
ALBUMIN/GLOB SERPL: 1.5 G/DL
ALP SERPL-CCNC: 79 U/L (ref 39–117)
ALT SERPL W P-5'-P-CCNC: 8 U/L (ref 1–41)
AMPHET+METHAMPHET UR QL: POSITIVE
ANION GAP SERPL CALCULATED.3IONS-SCNC: 11.8 MMOL/L (ref 5–15)
APAP SERPL-MCNC: <5 MCG/ML (ref 0–30)
AST SERPL-CCNC: 16 U/L (ref 1–40)
BARBITURATES UR QL SCN: NEGATIVE
BASOPHILS # BLD AUTO: 0.06 10*3/MM3 (ref 0–0.2)
BASOPHILS NFR BLD AUTO: 0.6 % (ref 0–1.5)
BENZODIAZ UR QL SCN: NEGATIVE
BILIRUB SERPL-MCNC: 0.4 MG/DL (ref 0–1.2)
BUN SERPL-MCNC: 8 MG/DL (ref 6–20)
BUN/CREAT SERPL: 9.5 (ref 7–25)
CALCIUM SPEC-SCNC: 9.3 MG/DL (ref 8.6–10.5)
CANNABINOIDS SERPL QL: NEGATIVE
CHLORIDE SERPL-SCNC: 103 MMOL/L (ref 98–107)
CO2 SERPL-SCNC: 21.2 MMOL/L (ref 22–29)
COCAINE UR QL: NEGATIVE
CREAT SERPL-MCNC: 0.84 MG/DL (ref 0.76–1.27)
DEPRECATED RDW RBC AUTO: 44.8 FL (ref 37–54)
EGFRCR SERPLBLD CKD-EPI 2021: 114.5 ML/MIN/1.73
EOSINOPHIL # BLD AUTO: 0.28 10*3/MM3 (ref 0–0.4)
EOSINOPHIL NFR BLD AUTO: 2.9 % (ref 0.3–6.2)
ERYTHROCYTE [DISTWIDTH] IN BLOOD BY AUTOMATED COUNT: 13.2 % (ref 12.3–15.4)
ETHANOL BLD-MCNC: <10 MG/DL (ref 0–10)
ETHANOL UR QL: <0.01 %
FENTANYL UR-MCNC: NEGATIVE NG/ML
GLOBULIN UR ELPH-MCNC: 2.5 GM/DL
GLUCOSE SERPL-MCNC: 119 MG/DL (ref 65–99)
HCT VFR BLD AUTO: 46.9 % (ref 37.5–51)
HGB BLD-MCNC: 14.9 G/DL (ref 13–17.7)
HOLD SPECIMEN: NORMAL
HOLD SPECIMEN: NORMAL
IMM GRANULOCYTES # BLD AUTO: 0.02 10*3/MM3 (ref 0–0.05)
IMM GRANULOCYTES NFR BLD AUTO: 0.2 % (ref 0–0.5)
LYMPHOCYTES # BLD AUTO: 2.68 10*3/MM3 (ref 0.7–3.1)
LYMPHOCYTES NFR BLD AUTO: 27.3 % (ref 19.6–45.3)
MCH RBC QN AUTO: 29.4 PG (ref 26.6–33)
MCHC RBC AUTO-ENTMCNC: 31.8 G/DL (ref 31.5–35.7)
MCV RBC AUTO: 92.7 FL (ref 79–97)
METHADONE UR QL SCN: NEGATIVE
MONOCYTES # BLD AUTO: 0.72 10*3/MM3 (ref 0.1–0.9)
MONOCYTES NFR BLD AUTO: 7.3 % (ref 5–12)
NEUTROPHILS NFR BLD AUTO: 6.06 10*3/MM3 (ref 1.7–7)
NEUTROPHILS NFR BLD AUTO: 61.7 % (ref 42.7–76)
NRBC BLD AUTO-RTO: 0 /100 WBC (ref 0–0.2)
OPIATES UR QL: NEGATIVE
OXYCODONE UR QL SCN: NEGATIVE
PLATELET # BLD AUTO: 171 10*3/MM3 (ref 140–450)
PMV BLD AUTO: 9.3 FL (ref 6–12)
POTASSIUM SERPL-SCNC: 4.3 MMOL/L (ref 3.5–5.2)
PROT SERPL-MCNC: 6.2 G/DL (ref 6–8.5)
RBC # BLD AUTO: 5.06 10*6/MM3 (ref 4.14–5.8)
SALICYLATES SERPL-MCNC: <0.3 MG/DL
SODIUM SERPL-SCNC: 136 MMOL/L (ref 136–145)
T4 FREE SERPL-MCNC: 1.06 NG/DL (ref 0.92–1.68)
TSH SERPL DL<=0.05 MIU/L-ACNC: 0.9 UIU/ML (ref 0.27–4.2)
WBC NRBC COR # BLD AUTO: 9.82 10*3/MM3 (ref 3.4–10.8)
WHOLE BLOOD HOLD SPECIMEN: NORMAL

## 2024-07-21 PROCEDURE — 80307 DRUG TEST PRSMV CHEM ANLYZR: CPT | Performed by: EMERGENCY MEDICINE

## 2024-07-21 PROCEDURE — 0HQEXZZ REPAIR LEFT LOWER ARM SKIN, EXTERNAL APPROACH: ICD-10-PCS | Performed by: EMERGENCY MEDICINE

## 2024-07-21 PROCEDURE — 63710000001 DIPHENHYDRAMINE PER 50 MG: Performed by: PSYCHIATRY & NEUROLOGY

## 2024-07-21 PROCEDURE — 80053 COMPREHEN METABOLIC PANEL: CPT | Performed by: EMERGENCY MEDICINE

## 2024-07-21 PROCEDURE — 84439 ASSAY OF FREE THYROXINE: CPT | Performed by: EMERGENCY MEDICINE

## 2024-07-21 PROCEDURE — 80143 DRUG ASSAY ACETAMINOPHEN: CPT | Performed by: EMERGENCY MEDICINE

## 2024-07-21 PROCEDURE — 84443 ASSAY THYROID STIM HORMONE: CPT | Performed by: EMERGENCY MEDICINE

## 2024-07-21 PROCEDURE — 36415 COLL VENOUS BLD VENIPUNCTURE: CPT

## 2024-07-21 PROCEDURE — 80179 DRUG ASSAY SALICYLATE: CPT | Performed by: EMERGENCY MEDICINE

## 2024-07-21 PROCEDURE — 82077 ASSAY SPEC XCP UR&BREATH IA: CPT | Performed by: EMERGENCY MEDICINE

## 2024-07-21 PROCEDURE — 85025 COMPLETE CBC W/AUTO DIFF WBC: CPT | Performed by: EMERGENCY MEDICINE

## 2024-07-21 RX ORDER — LORAZEPAM 2 MG/1
2 TABLET ORAL EVERY 4 HOURS PRN
Status: DISCONTINUED | OUTPATIENT
Start: 2024-07-21 | End: 2024-07-22 | Stop reason: HOSPADM

## 2024-07-21 RX ORDER — HYDROXYZINE HYDROCHLORIDE 25 MG/1
50 TABLET, FILM COATED ORAL EVERY 6 HOURS PRN
Status: DISCONTINUED | OUTPATIENT
Start: 2024-07-21 | End: 2024-07-22 | Stop reason: HOSPADM

## 2024-07-21 RX ORDER — ALUMINA, MAGNESIA, AND SIMETHICONE 2400; 2400; 240 MG/30ML; MG/30ML; MG/30ML
15 SUSPENSION ORAL EVERY 6 HOURS PRN
Status: DISCONTINUED | OUTPATIENT
Start: 2024-07-21 | End: 2024-07-22 | Stop reason: HOSPADM

## 2024-07-21 RX ORDER — DIPHENHYDRAMINE HYDROCHLORIDE 50 MG/ML
50 INJECTION INTRAMUSCULAR; INTRAVENOUS EVERY 4 HOURS PRN
Status: DISCONTINUED | OUTPATIENT
Start: 2024-07-21 | End: 2024-07-22 | Stop reason: HOSPADM

## 2024-07-21 RX ORDER — HALOPERIDOL 5 MG/1
5 TABLET ORAL EVERY 4 HOURS PRN
Status: DISCONTINUED | OUTPATIENT
Start: 2024-07-21 | End: 2024-07-22 | Stop reason: HOSPADM

## 2024-07-21 RX ORDER — ACETAMINOPHEN 325 MG/1
650 TABLET ORAL EVERY 6 HOURS PRN
Status: DISCONTINUED | OUTPATIENT
Start: 2024-07-21 | End: 2024-07-22 | Stop reason: HOSPADM

## 2024-07-21 RX ORDER — HALOPERIDOL 5 MG/ML
5 INJECTION INTRAMUSCULAR EVERY 4 HOURS PRN
Status: DISCONTINUED | OUTPATIENT
Start: 2024-07-21 | End: 2024-07-22 | Stop reason: HOSPADM

## 2024-07-21 RX ORDER — DIPHENHYDRAMINE HCL 50 MG
50 CAPSULE ORAL EVERY 4 HOURS PRN
Status: DISCONTINUED | OUTPATIENT
Start: 2024-07-21 | End: 2024-07-22 | Stop reason: HOSPADM

## 2024-07-21 RX ORDER — LORAZEPAM 2 MG/ML
2 INJECTION INTRAMUSCULAR EVERY 4 HOURS PRN
Status: DISCONTINUED | OUTPATIENT
Start: 2024-07-21 | End: 2024-07-22 | Stop reason: HOSPADM

## 2024-07-21 RX ORDER — LOPERAMIDE HYDROCHLORIDE 2 MG/1
2 CAPSULE ORAL
Status: DISCONTINUED | OUTPATIENT
Start: 2024-07-21 | End: 2024-07-22 | Stop reason: HOSPADM

## 2024-07-21 RX ORDER — NICOTINE 21 MG/24HR
1 PATCH, TRANSDERMAL 24 HOURS TRANSDERMAL DAILY PRN
Status: DISCONTINUED | OUTPATIENT
Start: 2024-07-21 | End: 2024-07-22 | Stop reason: HOSPADM

## 2024-07-21 RX ORDER — FLUOXETINE HYDROCHLORIDE 20 MG/1
20 CAPSULE ORAL DAILY
Status: DISCONTINUED | OUTPATIENT
Start: 2024-07-21 | End: 2024-07-22 | Stop reason: HOSPADM

## 2024-07-21 RX ORDER — POLYETHYLENE GLYCOL 3350 17 G/17G
17 POWDER, FOR SOLUTION ORAL DAILY PRN
Status: DISCONTINUED | OUTPATIENT
Start: 2024-07-21 | End: 2024-07-22 | Stop reason: HOSPADM

## 2024-07-21 RX ORDER — TRAZODONE HYDROCHLORIDE 100 MG/1
100 TABLET ORAL NIGHTLY PRN
Status: DISCONTINUED | OUTPATIENT
Start: 2024-07-21 | End: 2024-07-22 | Stop reason: HOSPADM

## 2024-07-21 RX ADMIN — LORAZEPAM 2 MG: 2 TABLET ORAL at 15:18

## 2024-07-21 RX ADMIN — FLUOXETINE HYDROCHLORIDE 20 MG: 20 CAPSULE ORAL at 11:54

## 2024-07-21 RX ADMIN — HALOPERIDOL 5 MG: 5 TABLET ORAL at 15:18

## 2024-07-21 RX ADMIN — LIDOCAINE HYDROCHLORIDE,EPINEPHRINE BITARTRATE 10 ML: 10; .01 INJECTION, SOLUTION INFILTRATION; PERINEURAL at 00:12

## 2024-07-21 RX ADMIN — DIPHENHYDRAMINE HYDROCHLORIDE 50 MG: 50 CAPSULE ORAL at 15:18

## 2024-07-21 RX ADMIN — BACITRACIN 0.9 G: 500 OINTMENT TOPICAL at 00:12

## 2024-07-21 NOTE — ED PROVIDER NOTES
"Subjective   History of Present Illness    Review of Systems    Past Medical History:   Diagnosis Date    Hyperlipidemia     Hypertension     Myocardial infarction        No Known Allergies    Past Surgical History:   Procedure Laterality Date    CORONARY STENT PLACEMENT      EAR RECONSTRUCTION         Family History   Problem Relation Age of Onset    Cancer Mother     Heart attack Mother     Other Mother         Amyloidosis    Depression Mother     Bipolar disorder Father     Drug abuse Father     Suicidality Father          by suicide (hanging) in     Lupus Maternal Grandmother        Social History     Socioeconomic History    Marital status: Single    Number of children: 1    Years of education: 9    Highest education level: 9th grade   Tobacco Use    Smoking status: Every Day     Current packs/day: 0.50     Average packs/day: 0.5 packs/day for 25.0 years (12.5 ttl pk-yrs)     Types: Cigarettes    Smokeless tobacco: Never   Vaping Use    Vaping status: Never Used   Substance and Sexual Activity    Alcohol use: Not Currently     Comment: PT STATES \"I'VE ONLY TAKEN A COUPLE OF SHOTS IN THE LAST 6 MONTHS\"    Drug use: Yes     Types: Marijuana, Methamphetamines     Comment: Last use between 9-11 last night    Sexual activity: Defer           Objective   Physical Exam    Laceration Repair    Date/Time: 2024 12:32 AM    Performed by: Rita Almonte APRN  Authorized by: Randy Santana MD    Consent:     Consent obtained:  Verbal    Consent given by:  Patient    Risks, benefits, and alternatives were discussed: yes      Risks discussed:  Infection, pain and poor wound healing    Alternatives discussed:  No treatment  Universal protocol:     Procedure explained and questions answered to patient or proxy's satisfaction: yes      Patient identity confirmed:  Verbally with patient and arm band  Anesthesia:     Anesthesia method:  Local infiltration    Local anesthetic:  Lidocaine 1% WITH epi  Laceration " details:     Location:  Shoulder/arm    Shoulder/arm location:  L lower arm    Length (cm):  5.5  Pre-procedure details:     Preparation:  Patient was prepped and draped in usual sterile fashion  Exploration:     Limited defect created (wound extended): no      Hemostasis achieved with:  Direct pressure    Wound exploration: wound explored through full range of motion and entire depth of wound visualized      Contaminated: no    Treatment:     Area cleansed with:  Povidone-iodine and saline    Amount of cleaning:  Standard    Irrigation solution:  Sterile saline    Irrigation volume:  300    Irrigation method:  Syringe    Visualized foreign bodies/material removed: no      Debridement:  None    Undermining:  None    Scar revision: no    Skin repair:     Repair method:  Sutures    Suture size:  4-0    Suture material:  Prolene    Suture technique:  Simple interrupted    Number of sutures:  10  Approximation:     Approximation:  Close  Repair type:     Repair type:  Simple  Post-procedure details:     Dressing:  Antibiotic ointment and adhesive bandage    Procedure completion:  Tolerated well, no immediate complications             ED Course  ED Course as of 07/22/24 0748   Sun Jul 21, 2024   0246 Eosinophils Absolute: 0.28 [RP]   0330 I discussed patient's workup and presentation with  of psychiatry who agrees to admission.  I have placed a bed request for him.  The patient's airway is intact, vital signs, and respiratory status are safe for admission at this time. [JS]      ED Course User Index  [JS] Wyatt Bustillo MD  [RP] Randy Santana MD                                             Medical Decision Making  Problems Addressed:  Closed displaced fracture of right calcaneus, unspecified portion of calcaneus, initial encounter: complicated acute illness or injury  Self-inflicted laceration of left wrist: complicated acute illness or injury  Suicidal ideation: complicated acute illness or injury    Amount  and/or Complexity of Data Reviewed  Labs: ordered. Decision-making details documented in ED Course.  Radiology: ordered.  ECG/medicine tests: ordered.    Risk  OTC drugs.  Prescription drug management.        Final diagnoses:   Closed displaced fracture of right calcaneus, unspecified portion of calcaneus, initial encounter   Self-inflicted laceration of left wrist   Suicidal ideation       ED Disposition  ED Disposition       ED Disposition   DC/Transfer to Behavioral Health Condition   Stable    Comment   --               No follow-up provider specified.       Medication List      No changes were made to your prescriptions during this visit.

## 2024-07-21 NOTE — ED NOTES
Per ems, Patient has been walking from [place] and was found in [facility] in a ditch after cutting his left wrist. Ems states that it may require stiches or glue, wrapped from ems on arrival. Patient has been having suicidal thoughts and has been having a lot of personal issues and has decided to seek help.

## 2024-07-21 NOTE — PLAN OF CARE
Goal Outcome Evaluation:  Plan of Care Reviewed With: patient  Patient Agreement with Plan of Care: agrees  See admit note

## 2024-07-21 NOTE — ED PROVIDER NOTES
Time: 11:17 PM EDT  Date of encounter:  2024  Independent Historian/Clinical History and Information was obtained by:   Patient    History is limited by: N/A    Chief Complaint: Suicidal ideation      History of Present Illness:  Patient is a 39 y.o. year old male who presents to the emergency department for evaluation of suicidal ideation.  Patient was attempting to walk from one family member's home to another.  He states his family members do not have a vehicle.  He stopped at a gas station reportedly and used a knife to try to kill himself by slitting his right wrist.  He does admit that this was a suicide attempt and not purely cutting for self-harm.  Has no medical complaints currently aside from right foot pain for the past 1 to 2 weeks.    HPI    Patient Care Team  Primary Care Provider: Provider, No Known    Past Medical History:     No Known Allergies  Past Medical History:   Diagnosis Date    Hyperlipidemia     Hypertension     Myocardial infarction      Past Surgical History:   Procedure Laterality Date    CORONARY STENT PLACEMENT      EAR RECONSTRUCTION       Family History   Problem Relation Age of Onset    Cancer Mother     Heart attack Mother     Other Mother         Amyloidosis    Depression Mother     Bipolar disorder Father     Drug abuse Father     Suicidality Father          by suicide (hanging) in     Lupus Maternal Grandmother        Home Medications:  Prior to Admission medications    Medication Sig Start Date End Date Taking? Authorizing Provider   ARIPiprazole (ABILIFY) 15 MG tablet Take 1 tablet by mouth Daily. Indications: Major Depressive Disorder 4/15/23   Gabriel Perez MD   aspirin 81 MG EC tablet Take 1 tablet by mouth Daily. Indications: Disease involving Lipid Deposits in the Arteries 4/15/23   Gabriel Perez MD   atorvastatin (LIPITOR) 40 MG tablet Take 1 tablet by mouth Every Night. Indications: Ischemic Heart Disease 23   Gabriel Perez MD        Social History:  "  Social History     Tobacco Use    Smoking status: Every Day     Current packs/day: 0.50     Average packs/day: 0.5 packs/day for 25.0 years (12.5 ttl pk-yrs)     Types: Cigarettes    Smokeless tobacco: Never   Vaping Use    Vaping status: Never Used   Substance Use Topics    Alcohol use: Not Currently     Comment: PT STATES \"I'VE ONLY TAKEN A COUPLE OF SHOTS IN THE LAST 6 MONTHS\"    Drug use: Yes     Types: Marijuana, Methamphetamines     Comment: Last use between 9-11 last night         Review of Systems:  Review of Systems   Constitutional:  Negative for chills and fever.   HENT:  Negative for congestion, rhinorrhea and sore throat.    Eyes:  Negative for photophobia.   Respiratory:  Negative for apnea, cough, chest tightness and shortness of breath.    Cardiovascular:  Negative for chest pain and palpitations.   Gastrointestinal:  Negative for abdominal pain, diarrhea, nausea and vomiting.   Endocrine: Negative.    Genitourinary:  Negative for difficulty urinating and dysuria.   Musculoskeletal:  Positive for arthralgias. Negative for back pain, joint swelling and myalgias.   Skin:  Positive for wound. Negative for color change.   Allergic/Immunologic: Negative.    Neurological:  Negative for seizures and headaches.   Psychiatric/Behavioral:  Positive for self-injury and suicidal ideas.    All other systems reviewed and are negative.       Physical Exam:  /77   Pulse 92   Temp 97.9 °F (36.6 °C)   Resp 18   Ht 185.4 cm (73\")   Wt 95.9 kg (211 lb 6.7 oz)   SpO2 100%   BMI 27.89 kg/m²     Physical Exam  Vitals and nursing note reviewed.   Constitutional:       General: He is awake.      Appearance: Normal appearance.   HENT:      Head: Normocephalic and atraumatic.      Nose: Nose normal.      Mouth/Throat:      Mouth: Mucous membranes are moist.   Eyes:      Extraocular Movements: Extraocular movements intact.      Pupils: Pupils are equal, round, and reactive to light.   Cardiovascular:      Rate " and Rhythm: Normal rate and regular rhythm.      Heart sounds: Normal heart sounds.   Pulmonary:      Effort: Pulmonary effort is normal. No respiratory distress.      Breath sounds: Normal breath sounds. No wheezing, rhonchi or rales.   Abdominal:      General: Bowel sounds are normal.      Palpations: Abdomen is soft.      Tenderness: There is no abdominal tenderness. There is no guarding or rebound.      Comments: No rigidity   Musculoskeletal:         General: No tenderness. Normal range of motion.      Cervical back: Normal range of motion and neck supple.   Skin:     General: Skin is warm and dry.      Coloration: Skin is not jaundiced.   Neurological:      General: No focal deficit present.      Mental Status: He is alert and oriented to person, place, and time. Mental status is at baseline.   Psychiatric:         Mood and Affect: Mood normal.                  Procedures:  Procedures      Medical Decision Making:      Comorbidities that affect care:    Hypertension, hyperlipidemia, CAD    External Notes reviewed:    Hospital Discharge Summary:    Discharge Summary  Gabriel Perez MD (Physician)  Psychiatry  [facility]                                                                           DISCHARGE SUMMARY     Patient Name: Arnu Anaya  : 1985  MRN: [id number]     Date of Admission: 2023  Date of Discharge: 2023  Primary Care Physician: Provider, No Known         Consults      No orders found from 3/14/2023 to 2023.             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            Hospital Course      Hospital Course:  Arun Anaya is a 38 y.o. male admitted on a voluntary basis for depression with suicidal ideations.  Patient also with a long history of substance use and recently relapsed on methamphetamine and marijuana.  [facility] and [facility] and relapsed.  [facility] presented [facility] found to be depressed with suicidal ideations since emergency room for evaluation and admitted to [facility].     Patient reports not doing well on antidepressants in the past.  Reports he has a history of remote psychosis.  He was agreeable to a trial of aripiprazole and this was started at 15 mg.  The patient is tolerating medication well with no side effects.     He was denying suicidal ideation on initial evaluation.  Has been free of suicidal ideations throughout his stay.  He has been calm and cooperative.  Patient continued to appear somewhat dejected and flat in his affect but was appropriate and calm.  He was up and out in the milieu with peers.  He had no acute agitation did not require any extra medications for agitation.  He did not require any medicines to help with sleep.  Patient does acknowledge needing to get himself together and remaining clean and sober.  Patient reports he has numerous social and personal issues but feels that he can handle them better with sobriety.  He also wants to remain on medication.     On admission the patient had a very small, barely visible scratches to his left wrist from what he reported to be a suicide attempt.  He has had no further suicidal ideation since he came into the hospital.  He reports he feels much better now.  Patient reports that he needs a challenge for his brain and is preferred to go back to [facility].  He reports that he feels better and feels like there is help for him and  "wants to get back to sobriety.     Date of discharge the patient is calm, cooperative, future oriented and goal directed.  There is no acute psychomotor restlessness or agitation.  Continues to make fair, but limited eye contact.  Speaks in a low tone but is appropriate engaging.  Speech is decreased tone but normal rate.  Language is appropriate relevant.  Mood is described as \"not depressed since I have been here\" but his affect is rather constricted.  Thought processes are sequential and goal directed.  Thought content is negative for suicidal homicidal ideation.  There is no auditory visualizations.  Insight and judgment are intact.     Patient wanted to get back to drug and alcohol rehabilitation facilities been contacted and he is able to return there today.             The following orders were placed and all results were independently analyzed by me:  Orders Placed This Encounter   Procedures    Laceration Repair    [facility] 08.  CAM Boot; Yes; Yes; possible fracture; Yes    XR Ankle 3+ View Right    XR Foot 2 View Right    Gray Draw    Comprehensive Metabolic Panel    Ethanol    Urine Drug Screen - Urine, Clean Catch    Acetaminophen Level    Salicylate Level    TSH    T4, Free    CBC Auto Differential    Ambulatory Referral to Podiatry    Supplies To Bedside - Notify MD When Ready- Suture Tray / Cart    Apply Boot    ECG 12 Lead QT Measurement    CBC & Differential    Green Top (Gel)    Lavender Top    Gold Top - SST    Light Blue Top       Medications Given in the Emergency Department:  Medications   lidocaine 1% - EPINEPHrine 1:[id number] (XYLOCAINE W/EPI) 1 %-1:561361 injection 10 mL (10 mL Injection Given 7/21/24 0012)   bacitracin 500 UNIT/GM ointment 0.9 g (0.9 g Topical Given 7/21/24 0012)        ED Course:    ED Course as of 07/22/24 0225   Sun Jul 21, 2024   0246 Eosinophils Absolute: 0.28 [RP]   0330 I discussed patient's workup and presentation with  of psychiatry who agrees " to admission.  I have placed a bed request for him.  The patient's airway is intact, vital signs, and respiratory status are safe for admission at this time. [JS]      ED Course User Index  [JS] Wyatt Bustillo MD  [RP] Randy Santana MD       Labs:    Lab Results (last 24 hours)       ** No results found for the last 24 hours. **             Imaging:    No Radiology Exams Resulted Within Past 24 Hours      Differential Diagnosis and Discussion:    Psychiatric: Differential diagnosis includes but is not limited to depression, psychosis, bipolar disorder, anxiety, manic episode, schizophrenia, and substance abuse.    All labs were reviewed and interpreted by me.  All X-rays impressions were independently interpreted by me.  EKG was interpreted by me.    MDM     Amount and/or Complexity of Data Reviewed  Clinical lab tests: reviewed  Tests in the radiology section of CPT®: reviewed  Tests in the medicine section of CPT®: reviewed                 Patient Care Considerations:          Consultants/Shared Management Plan:    Psychiatry on-call, Dr. Snyder agrees to psychiatry admission    Social Determinants of Health:    Patient is independent, reliable, and has access to care.       Disposition and Care Coordination:    Psychiatric Admission: Through independent evaluation of the patient's history and physical and consultation with psychiatry, the patient meets criteria for admission to a psychiatric facility.        Final diagnoses:   Closed displaced fracture of right calcaneus, unspecified portion of calcaneus, initial encounter   Self-inflicted laceration of left wrist   Suicidal ideation        ED Disposition       ED Disposition   DC/Transfer to Behavioral Health    Condition   Stable    Comment   --               This medical record created using voice recognition software.             Randy Santana MD  07/22/24 0225
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
## 2024-07-21 NOTE — PLAN OF CARE
Goal Outcome Evaluation:  Plan of Care Reviewed With: patient  Patient Agreement with Plan of Care: disagrees (describe) (Demanding to leave.)           Patient endorsed suicidal thoughts this morning, stating he doesn't want to live anymore. Denies hi/avh. Rates anxiety 4, depression 7. Was withdrawn to room sleeping all day until 1500. Patient got up and stated he was ready to leave. Explained to patient that he would have to talk to the Dr in the morning about discharge. Patient became increasingly agitated, tried to open doors to leave. Patient agreeable to take po prn agitation protocol. Ativan 2mg, Benadryl 50mg, and Haldol 5mg given at 1518. Patient currently resting in bed. Will continue to monitor.
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
## 2024-07-22 VITALS
BODY MASS INDEX: 28.64 KG/M2 | DIASTOLIC BLOOD PRESSURE: 83 MMHG | SYSTOLIC BLOOD PRESSURE: 114 MMHG | HEART RATE: 74 BPM | TEMPERATURE: 98.7 F | RESPIRATION RATE: 16 BRPM | OXYGEN SATURATION: 100 % | HEIGHT: 72 IN | WEIGHT: 211.42 LBS

## 2024-07-22 LAB
QT INTERVAL: 417 MS
QTC INTERVAL: 482 MS

## 2024-07-22 RX ORDER — FLUOXETINE HYDROCHLORIDE 20 MG/1
20 CAPSULE ORAL DAILY
Qty: 30 CAPSULE | Refills: 1 | Status: SHIPPED | OUTPATIENT
Start: 2024-07-23

## 2024-07-22 RX ADMIN — FLUOXETINE HYDROCHLORIDE 20 MG: 20 CAPSULE ORAL at 10:28
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
## 2024-07-22 NOTE — SIGNIFICANT NOTE
"   07/22/24 1053   Plan   Plan Patient reports no interest in referrals for rehabilitation services today. He reported to his nurse that his original plan is no longer in place. He would like to discharge \"to the streets\". List of sober living providers will be provided to patient. He will also be given information to reach out to [facility] if symptoms worsen.   Patient/Family in Agreement with Plan yes   Final Discharge Disposition Code 30 - still a patient       "

## 2024-07-22 NOTE — PLAN OF CARE
Goal Outcome Evaluation:  Plan of Care Reviewed With: patient  Patient Agreement with Plan of Care: disagrees (describe) (requests discharge)                                    Pt has been withdrawn to bed for the day.  He denies SI/HI, AVH. He declines to answer questions regarding mood, anxiety or depression.  Pt requests discharge.  He declines any outpatient or rehab services. He is able to make his needs known. Will continue to monitor and provide safe environment.

## 2024-07-22 NOTE — PLAN OF CARE
Goal Outcome Evaluation:  Plan of Care Reviewed With: patient  Patient Agreement with Plan of Care: agrees         Pt is alert and oriented and able to make needs known.  Pt denies SI or HI.  Pt denies a/v/h.  Pt has been resting in bed for most of the shift.  Pt had snack.  Pt cooperated with v/s.  No s/s of acute distress observed at this time.

## 2024-07-23 ENCOUNTER — TELEPHONE (OUTPATIENT)
Dept: PODIATRY | Facility: CLINIC | Age: 39
End: 2024-07-23
Payer: MEDICAID

## 2024-07-25 ENCOUNTER — APPOINTMENT (OUTPATIENT)
Dept: GENERAL RADIOLOGY | Facility: HOSPITAL | Age: 39
End: 2024-07-25
Payer: MEDICAID

## 2024-07-25 ENCOUNTER — HOSPITAL ENCOUNTER (EMERGENCY)
Facility: HOSPITAL | Age: 39
Discharge: HOME | End: 2024-07-25
Attending: EMERGENCY MEDICINE
Payer: MEDICAID

## 2024-07-25 VITALS
SYSTOLIC BLOOD PRESSURE: 132 MMHG | WEIGHT: 207.01 LBS | OXYGEN SATURATION: 99 % | HEART RATE: 88 BPM | HEIGHT: 72 IN | RESPIRATION RATE: 18 BRPM | BODY MASS INDEX: 28.04 KG/M2 | TEMPERATURE: 98.3 F | DIASTOLIC BLOOD PRESSURE: 68 MMHG

## 2024-07-25 DIAGNOSIS — L03.011 FELON OF FINGER OF RIGHT HAND: Primary | ICD-10-CM

## 2024-07-25 PROCEDURE — 73140 X-RAY EXAM OF FINGER(S): CPT

## 2024-07-25 PROCEDURE — 99283 EMERGENCY DEPT VISIT LOW MDM: CPT

## 2024-07-25 RX ORDER — IBUPROFEN 800 MG/1
800 TABLET ORAL EVERY 6 HOURS PRN
Qty: 30 TABLET | Refills: 0 | Status: SHIPPED | OUTPATIENT
Start: 2024-07-25

## 2024-07-25 RX ORDER — QUETIAPINE FUMARATE 25 MG/1
50 TABLET, FILM COATED ORAL NIGHTLY
COMMUNITY

## 2024-07-25 RX ORDER — ATORVASTATIN CALCIUM 10 MG/1
10 TABLET, FILM COATED ORAL DAILY
COMMUNITY

## 2024-07-25 RX ORDER — ASPIRIN 81 MG/1
81 TABLET ORAL DAILY
COMMUNITY

## 2024-07-25 RX ORDER — ACETAMINOPHEN 325 MG/1
650 TABLET ORAL ONCE
Status: DISCONTINUED | OUTPATIENT
Start: 2024-07-25 | End: 2024-07-25 | Stop reason: HOSPADM

## 2024-07-25 RX ORDER — AMOXICILLIN AND CLAVULANATE POTASSIUM 875; 125 MG/1; MG/1
1 TABLET, FILM COATED ORAL ONCE
Status: COMPLETED | OUTPATIENT
Start: 2024-07-25 | End: 2024-07-25

## 2024-07-25 RX ORDER — AMOXICILLIN AND CLAVULANATE POTASSIUM 875; 125 MG/1; MG/1
1 TABLET, FILM COATED ORAL 2 TIMES DAILY
Qty: 20 TABLET | Refills: 0 | Status: SHIPPED | OUTPATIENT
Start: 2024-07-25 | End: 2024-08-04

## 2024-07-25 RX ORDER — CEPHALEXIN 500 MG/1
500 CAPSULE ORAL 2 TIMES DAILY
COMMUNITY
End: 2024-07-25

## 2024-07-25 RX ORDER — ESCITALOPRAM OXALATE 10 MG/1
10 TABLET ORAL DAILY
COMMUNITY

## 2024-07-25 RX ADMIN — AMOXICILLIN AND CLAVULANATE POTASSIUM 1 TABLET: 875; 125 TABLET, FILM COATED ORAL at 01:33

## 2024-07-25 NOTE — ED TRIAGE NOTES
PT ARRIVES FROM [facility]IA [facility] WITH COMPLAINTS OF POSSIBLE RIGHT INDEX FINGER INFECTION; PT DENIES ANY INJURY OR BITE TO THE FINGER; PT FINGER IS SWOLLEN BUT NO REDNESS ON ARRIVAL

## 2024-07-25 NOTE — ED PROVIDER NOTES
"Time: 12:21 AM EDT  Date of encounter:  2024  Independent Historian/Clinical History and Information was obtained by:   Patient and staff from [facility]    History is limited by: N/A    Chief Complaint: Finger pain and injury      History of Present Illness:  Patient is a 39 y.o. year old male who presents to the emergency department for evaluation of finger pain for 1 week.  Patient is unsure if he suffered an injury when he broke his phone when he cut it with glass or got glass in it or if he got bitten by something but has progressively worsening pain in his right index finger with swelling.  No fever.  No numbness tingling or weakness.  No history of MRSA.  No recent antibiotic EKG    HPI    Patient Care Team  Primary Care Provider: Provider, No Known    Past Medical History:     No Known Allergies  Past Medical History:   Diagnosis Date    Hyperlipidemia     Hypertension     Myocardial infarction      Past Surgical History:   Procedure Laterality Date    CORONARY STENT PLACEMENT      EAR RECONSTRUCTION       Family History   Problem Relation Age of Onset    Cancer Mother     Heart attack Mother     Other Mother         Amyloidosis    Depression Mother     Bipolar disorder Father     Drug abuse Father     Suicidality Father          by suicide (hanging) in     Lupus Maternal Grandmother        Home Medications:  Prior to Admission medications    Medication Sig Start Date End Date Taking? Authorizing Provider   FLUoxetine (PROzac) 20 MG capsule Take 1 capsule by mouth Daily. Indications: Major Depressive Disorder 24   Gabriel Perez MD        Social History:   Social History     Tobacco Use    Smoking status: Every Day     Current packs/day: 0.50     Average packs/day: 0.5 packs/day for 25.0 years (12.5 ttl pk-yrs)     Types: Cigarettes    Smokeless tobacco: Never   Vaping Use    Vaping status: Never Used   Substance Use Topics    Alcohol use: Not Currently     Comment: PT STATES \"I'VE ONLY " "TAKEN A COUPLE OF SHOTS IN THE LAST 6 MONTHS\"    Drug use: Yes     Types: Marijuana, Methamphetamines     Comment: Last use between 9-11 last night         Review of Systems:  Review of Systems   Constitutional:  Negative for fever.   Musculoskeletal:  Positive for arthralgias (Right index finger) and joint swelling (Right index fingertip).   Skin:  Positive for color change (Erythema right middle finger).   Neurological:  Negative for weakness and numbness.   Hematological: Negative.    Psychiatric/Behavioral: Negative.     All other systems reviewed and are negative.       Physical Exam:  /68 (BP Location: Right arm, Patient Position: Sitting)   Pulse 88   Temp 98.3 °F (36.8 °C) (Oral)   Resp 18   Ht 182.9 cm (72\")   Wt 93.9 kg (207 lb 0.2 oz)   SpO2 99%   BMI 28.08 kg/m²     Physical Exam  Vitals and nursing note reviewed.   HENT:      Head: Atraumatic.      Nose: Nose normal.      Mouth/Throat:      Mouth: Mucous membranes are moist.   Eyes:      Conjunctiva/sclera: Conjunctivae normal.   Cardiovascular:      Pulses: Normal pulses.   Pulmonary:      Effort: Pulmonary effort is normal.   Musculoskeletal:         General: Swelling (Pad of right index finger) and tenderness (Right index finger pad distal) present. Normal range of motion.      Cervical back: Normal range of motion.   Skin:     General: Skin is warm and dry.      Findings: Erythema (Focal erythema to right index finger pad with findings of abscess.  No lymphangitis) present.   Neurological:      General: No focal deficit present.      Mental Status: He is alert.   Psychiatric:         Mood and Affect: Mood normal.         Behavior: Behavior normal.                  Procedures:  Incision & Drainage    Date/Time: 7/25/2024 12:53 AM    Performed by: Tasha Bacon APRN  Authorized by: Dusty Rutherford DO    Consent:     Consent obtained:  Verbal    Consent given by:  Patient    Risks, benefits, and alternatives were discussed: yes      Risks " discussed:  Bleeding, incomplete drainage, infection, pain and damage to other organs    Alternatives discussed:  No treatment  Universal protocol:     Procedure explained and questions answered to patient or proxy's satisfaction: yes      Imaging studies available: yes      Patient identity confirmed:  Verbally with patient  Location:     Type:  Abscess (felon)    Size:  0.5 cm    Location:  Upper extremity    Upper extremity location:  Finger    Finger location:  R index finger  Pre-procedure details:     Skin preparation:  Povidone-iodine  Anesthesia:     Anesthesia method:  Local infiltration    Local anesthetic:  Lidocaine 1% w/o epi  Procedure type:     Complexity:  Simple  Procedure details:     Incision types:  Single straight (From the lateral aspect superficially)    Wound management:  Probed and deloculated    Drainage:  Purulent    Drainage amount:  Moderate    Wound treatment:  Wound left open    Packing materials:  None  Post-procedure details:     Procedure completion:  Tolerated well, no immediate complications        Medical Decision Making:      Comorbidities that affect care:    Hypertension, Smoking, Substance Abuse    External Notes reviewed:    Previous Admission Note: Patient's most recent admission from July 21 to 24 for depression and methamphetamine abuse was reviewed.  Patient was sent from the psychiatric han to [facility]      The following orders were placed and all results were independently analyzed by me:  Orders Placed This Encounter   Procedures    Incision & Drainage    XR Finger 2+ View Right       Medications Given in the Emergency Department:  Medications   acetaminophen (TYLENOL) tablet 650 mg (650 mg Oral Not Given 7/25/24 0323)   amoxicillin-clavulanate (AUGMENTIN) 875-125 MG per tablet 1 tablet (1 tablet Oral Given 7/25/24 0133)        ED Course:    ED Course as of 07/25/24 0328   Thu Jul 25, 2024   0122 XR Finger 2+ View Right  No acute findings [DS]      ED Course  User Index  [DS] Tasha Bacon APRN       Labs:    Lab Results (last 24 hours)       ** No results found for the last 24 hours. **             Imaging:    XR Finger 2+ View Right    Result Date: 7/25/2024  XR FINGER 2+ VW RIGHT-: 7/25/2024 1:08 AM  INDICATION: Swelling. Rule out foreign body.  COMPARISON: None available.  FINDINGS: 3 views of the right second finger.  No fracture or dislocation.  No bone erosion or destruction.  No foreign body. No soft tissue gas.      Negative right second finger.  Electronically Signed By-Dr. Huseyin Sheldon MD On:7/25/2024 1:20 AM         Differential Diagnosis and Discussion:    Extremity Pain: Differential diagnosis includes but is not limited to soft tissue sprain, tendonitis, tendon injury, dislocation, fracture, deep vein thrombosis, arterial insufficiency, osteoarthritis, bursitis, and ligamentous damage.    All X-rays impressions were independently interpreted by me.    MDM  Number of Diagnoses or Management Options  Felon of finger of right hand  Diagnosis management comments: I have explained the patient´s condition, diagnoses and treatment plan based on the information available to me at this time. I have answered questions and addressed any concerns. The patient has a good  understanding of the patient´s diagnosis, condition, and treatment plan as can be expected at this point. The vital signs have been stable. The patient´s condition is stable and appropriate for discharge from the emergency department.      The patient will pursue further outpatient evaluation with the primary care physician or other designated or consulting physician as outlined in the discharge instructions. They are agreeable to this plan of care and follow-up instructions have been explained in detail. The patient has received these instructions in written format and have expressed an understanding of the discharge instructions. The patient is aware that any significant change in condition or  worsening of symptoms should prompt an immediate return to this or the closest emergency department or call to 911.       Amount and/or Complexity of Data Reviewed  Tests in the radiology section of CPT®: reviewed and ordered  Tests in the medicine section of CPT®: ordered and reviewed    Risk of Complications, Morbidity, and/or Mortality  Presenting problems: low  Diagnostic procedures: low  Management options: low    Patient Progress  Patient progress: stable         Patient Care Considerations:    NARCOTICS: I considered prescribing opiate pain medication as an outpatient, however patient is currently at [facility] for substance abuse      Consultants/Shared Management Plan:    None    Social Determinants of Health:    Patient is presented with the staff from [facility] and has reliable access to care      Disposition and Care Coordination:    Discharged: The patient is suitable and stable for discharge with no need for consideration of admission.    I have explained the patient´s condition, diagnoses and treatment plan based on the information available to me at this time. I have answered questions and addressed any concerns. The patient has a good  understanding of the patient´s diagnosis, condition, and treatment plan as can be expected at this point. The vital signs have been stable. The patient´s condition is stable and appropriate for discharge from the emergency department.      The patient will pursue further outpatient evaluation with the primary care physician or other designated or consulting physician as outlined in the discharge instructions. They are agreeable to this plan of care and follow-up instructions have been explained in detail. The patient has received these instructions in written format and has expressed an understanding of the discharge instructions. The patient is aware that any significant change in condition or worsening of symptoms should prompt an immediate return to this or  the closest emergency department or call to 911.  I have explained discharge medications and the need for follow up with the patient/caretakers. This was also printed in the discharge instructions. Patient was discharged with the following medications and follow up:      Medication List        New Prescriptions      amoxicillin-clavulanate 875-125 MG per tablet  Commonly known as: AUGMENTIN  Take 1 tablet by mouth 2 (Two) Times a Day for 10 days.     ibuprofen 800 MG tablet  Commonly known as: ADVIL,MOTRIN  Take 1 tablet by mouth Every 6 (Six) Hours As Needed for Mild Pain or Moderate Pain.               Where to Get Your Medications        You can get these medications from any pharmacy    Bring a paper prescription for each of these medications  amoxicillin-clavulanate 875-125 MG per tablet  ibuprofen 800 MG tablet      Provider, No Known  [facility]  [place] [id number]    In 2 days  For wound re-check       Final diagnoses:   Felon of finger of right hand        ED Disposition       ED Disposition   Discharge    Condition   Stable    Comment   --               This medical record created using voice recognition software.             Tasha Bacon, APRN  07/25/24 0328